# Patient Record
Sex: FEMALE | Race: WHITE
[De-identification: names, ages, dates, MRNs, and addresses within clinical notes are randomized per-mention and may not be internally consistent; named-entity substitution may affect disease eponyms.]

---

## 2016-08-10 LAB — PAP SMEAR - HIM PATIENT REPORTED: NEGATIVE

## 2017-09-10 ENCOUNTER — HEALTH MAINTENANCE LETTER (OUTPATIENT)
Age: 52
End: 2017-09-10

## 2017-09-11 ENCOUNTER — OFFICE VISIT (OUTPATIENT)
Dept: OPHTHALMOLOGY | Facility: CLINIC | Age: 52
End: 2017-09-11

## 2017-09-11 VITALS — WEIGHT: 135 LBS | HEIGHT: 66 IN | BODY MASS INDEX: 21.69 KG/M2

## 2017-09-11 DIAGNOSIS — H02.836 DERMATOCHALASIS OF EYELIDS OF BOTH EYES: ICD-10-CM

## 2017-09-11 DIAGNOSIS — H02.833 DERMATOCHALASIS OF EYELIDS OF BOTH EYES: ICD-10-CM

## 2017-09-11 DIAGNOSIS — H02.839 DERMATOCHALASIS: Primary | ICD-10-CM

## 2017-09-11 ASSESSMENT — CONF VISUAL FIELD
OD_NORMAL: 1
OS_NORMAL: 1

## 2017-09-11 ASSESSMENT — VISUAL ACUITY
METHOD: SNELLEN - LINEAR
OS_SC+: +
OD_SC: 20/15
OS_SC: 20/40

## 2017-09-11 ASSESSMENT — MARGIN REFLEX DISTANCE
OS_MRD1: 3
OD_MRD1: 3

## 2017-09-11 ASSESSMENT — TONOMETRY
OS_IOP_MMHG: 10
OD_IOP_MMHG: 09
IOP_METHOD: ICARE

## 2017-09-11 ASSESSMENT — EXTERNAL EXAM - LEFT EYE: OS_EXAM: NORMAL

## 2017-09-11 ASSESSMENT — SLIT LAMP EXAM - LIDS
COMMENTS: NORMAL
COMMENTS: NORMAL

## 2017-09-11 ASSESSMENT — EXTERNAL EXAM - RIGHT EYE: OD_EXAM: NORMAL

## 2017-09-11 NOTE — PROGRESS NOTES
Chief Complaints and History of Present Illnesses   Patient presents with     Consult For     droopy lids       Holding her lids open by the end of the day.  Interfering with daily life, hard to read.  Friends and family has noticed her hand positioning keeping her eyes open.    Trouble swallowing periodically and chewing late in the day.  Does say she's has lupus but not MS.  Occasional diplopia.   Chronic history of migraines.       Oma Gaona is a 52 year old female with the following diagnoses:   1. Dermatochalasis    2. Dermatochalasis of eyelids of both eyes         FUNCTIONAL COMPLAINTS RELATED TO DROOPY EYELIDS/BROWS:  Oma Gaona describes upper lids interfering with superior visual field and interfering with activities of daily living including reading, driving and watching television.     EXAM:   Dominant eye right  Minimal fatigue on upward gaze testing    MRD1: Right eye 3   Left eye 3  Dermatochalasis with excess skin touching eyelashes  Brow ptosis with brow resting below superior orbital rim    VISUAL FIELD:  Right eye untaped:15 degrees Right eye taped:45 degrees  Left eye untaped:10 degrees Left eye taped:45 degrees    Right eye visual field improves by: 30 degrees  Left eye visual field improves by: 35 degrees      PLAN:  Bilateral upper blepharoplasty - skin + nasal fat pad resection + brassiere suture         Tima Caldera MD  Ophthalmology Resident, PGY-2    Attending Physician Attestation:  I have seen and examined this patient .  I have confirmed and edited as necessary the chief complaint(s), history of present illness, review of systems, relevant history, and examination findings as documented by others.  I have personally reviewed the relevant tests, images, and reports as documented above.  I have confirmed and edited as necessary the assessment and plan and agree with this note.    - Samuel Abdi MD 2:16 PM 9/11/2017    Photographs were obtained to document the findings  recorded under exam. These will be stored for future reference and comparison. The plan is documented under assessment and plan above.   - Samuel Abdi MD 2:16 PM 9/11/2017    Today with Oma Gaona, I reviewed the indications, risks, benefits, and alternatives of the proposed surgical procedure including, but not limited to, failure obtain the desired result  and need for additional surgery, bleeding, infection, loss of vision, loss of the eye, and the remote possibility of permanent damage to any organ system or death with the use of anesthesia.  I provided multiple opportunities for the questions, answered all questions to the best of my ability, and confirmed that my answers and my discussion were understood.     - Samuel Abdi MD 2:16 PM 9/11/2017

## 2017-09-11 NOTE — PATIENT INSTRUCTIONS
BLEPHAROPLASTY    Your eyes are often the first thing people notice about you and are an important aspect of your overall appearance. As we age, the tone and shape of our eyelids can loosen and sag. Heredity and sun exposure also contribute to this process. This excess, puffy or lax skin can make you appear more tired or appear older. Eyelid surgery or blepharoplasty (pronounced  zlkz-y-iz-plasty ) can give the eyes a more youthful look by removing excess skin, bulging fat, and lax muscle from the upper or lower eyelids. If the sagging upper eyelid skin obstructs peripheral vision, blepharoplasty can eliminate the obstruction and expand the visual field.     Upper Blepharoplasty     For the upper eyelids, excess skin and fat are removed through an incision hidden in the natural eyelid crease. If the lid is droopy (ptosis), the muscle that raises the upper eyelid can be tightened. The incision is then closed with fine sutures.     Lower Blepharoplasty     Fat in the lower eyelids can be removed or repositioned through an incision hidden on the inner surface of the eyelid.  If there is excessive skin in the lower lid, the skin can be removed through incision is made just below the lashes. Fat can be removed or repositioned through this incision, and the excess skin removed. The incision is then closed with fine sutures.     Upper and Lower Blepharoplasty     Upper and lower blepharoplasty can be performed together and also can be combined with other procedures such as eyebrow or forehead lift, midface lift, face lift, neck lift, or laser skin resurfacing.  The procedures are typically performed as an outpatient procedure and typically take 45 min to 1.5 hours to perform.  Most patients can return to normal activities within 1-2 weeks. Makeup may be worn to camouflage any bruising after one week.       Who Should Perform A Blepharoplasty?     When choosing a surgeon to perform blepharoplasty, look for a cosmetic and  reconstructive surgeon who specializes in the eyelids, orbit, and tear drain system. Dr. Abdi s membership in the American Society of Ophthalmic Plastic and Reconstructive Surgery (ASOPRS) indicates he is not only a board certified ophthalmologist who knows the anatomy and structure of the eyelids and orbit, but also has had extensive training in ophthalmic plastic reconstructive and cosmetic surgery.

## 2017-09-11 NOTE — MR AVS SNAPSHOT
After Visit Summary   9/11/2017    Oma Gaona    MRN: [id number]           Patient Information     Date Of Birth          1965        Visit Information        Provider Department      9/11/2017 1:15 PM Samuel Abdi MD [facility] Ophthalmology        Today's Diagnoses     Dermatochalasis    -  1    Dermatochalasis of eyelids of both eyes          Care Instructions    BLEPHAROPLASTY    Your eyes are often the first thing people notice about you and are an important aspect of your overall appearance. As we age, the tone and shape of our eyelids can loosen and sag. Heredity and sun exposure also contribute to this process. This excess, puffy or lax skin can make you appear more tired or appear older. Eyelid surgery or blepharoplasty (pronounced  nlqf-k-lo-plasty ) can give the eyes a more youthful look by removing excess skin, bulging fat, and lax muscle from the upper or lower eyelids. If the sagging upper eyelid skin obstructs peripheral vision, blepharoplasty can eliminate the obstruction and expand the visual field.     Upper Blepharoplasty     For the upper eyelids, excess skin and fat are removed through an incision hidden in the natural eyelid crease. If the lid is droopy (ptosis), the muscle that raises the upper eyelid can be tightened. The incision is then closed with fine sutures.     Lower Blepharoplasty     Fat in the lower eyelids can be removed or repositioned through an incision hidden on the inner surface of the eyelid.  If there is excessive skin in the lower lid, the skin can be removed through incision is made just below the lashes. Fat can be removed or repositioned through this incision, and the excess skin removed. The incision is then closed with fine sutures.     Upper and Lower Blepharoplasty     Upper and lower blepharoplasty can be performed together and also can be combined with other procedures such as eyebrow or forehead lift, midface lift, face lift, neck lift,  or laser skin resurfacing.  The procedures are typically performed as an outpatient procedure and typically take 45 min to 1.5 hours to perform.  Most patients can return to normal activities within 1-2 weeks. Makeup may be worn to camouflage any bruising after one week.       Who Should Perform A Blepharoplasty?     When choosing a surgeon to perform blepharoplasty, look for a cosmetic and reconstructive surgeon who specializes in the eyelids, orbit, and tear drain system. Dr. Abdi s membership in the American Society of Ophthalmic Plastic and Reconstructive Surgery (ASOPRS) indicates he is not only a board certified ophthalmologist who knows the anatomy and structure of the eyelids and orbit, but also has had extensive training in ophthalmic plastic reconstructive and cosmetic surgery.              Follow-ups after your visit        Your next 10 appointments already scheduled     Nov 27, 2017  1:15 PM CST   (Arrive by 1:00 PM)   Post-Op with Samuel Abdi MD   [facility] ([facility] [place])    [address] [place] [id number] [phone]              Who to contact     Please call your clinic at [phone] to:    Ask questions about your health    Make or cancel appointments    Discuss your medicines    Learn about your test results    Speak to your doctor   If you have compliments or concerns about an experience at your clinic, or if you wish to file a complaint, please contact [facility] Patient Relations at [phone] or email us at [email or web address]         Additional Information About Your Visit        MyChart Information     [employer] gives you secure access to your electronic health record. If you see a primary care provider, you can also send messages to your care team and make appointments. If you have questions, please call your primary care clinic.  If you do not have a primary care provider,  "please call [phone] and they will assist you.      [employer] is an electronic gateway that provides easy, online access to your medical records. With [employer], you can request a clinic appointment, read your test results, renew a prescription or communicate with your care team.     To access your existing account, please contact your [facility] or call [phone] for assistance.        Care EveryWhere ID     This is your Care EveryWhere ID. This could be used by other organizations to access your [facility] medical records  [id number]        Your Vitals Were     Height BMI (Body Mass Index)                1.676 m (5' 6\") 21.79 kg/m2           Blood Pressure from Last 3 Encounters:   02/15/16 114/66    Weight from Last 3 Encounters:   09/11/17 61.2 kg (135 lb)   02/15/16 64 kg (141 lb)              We Performed the Following     External Photos OU (both eyes)     Link VF Ptosis OU     Jany-Operative Worksheet (Plastics)        Primary Care Provider    None Specified       No primary provider on file.        Equal Access to Services     [facility] Center[facility] : Hadii brenda garcia hadasho Soomaali, waaxda luqadaha, qaybta kaalmada adeamadoyamyra, alexandr garza . So [facility] [phone].    ATENCIÓN: Si habla español, tiene a [place] disposición servicios gratuitos de asistencia lingüística. Llame al [phone].    We comply with applicable federal civil rights laws and [place] laws. We do not discriminate on the basis of race, color, national origin, age, disability sex, sexual orientation or gender identity.            Thank you!     Thank you for choosing [facility] OPHTHALMOLOGY  for your care. Our goal is always to provide you with excellent care. Hearing back from our patients is one way we can continue to improve our services. Please take a few minutes to complete the written survey that you may receive in the mail after your visit with us. Thank you!             Your Updated " Medication List - Protect others around you: Learn how to safely use, store and throw away your medicines at www.disposemymeds.org.          This list is accurate as of: 9/11/17  2:32 PM.  Always use your most recent med list.                   Brand Name Dispense Instructions for use Diagnosis    ADVIL PO           atovaquone-proguanil 250-100 MG per tablet    MALARONE    46 tablet    Take 1 tablet by mouth daily Start 2 days before exposure to Malaria and continue daily till  7 days after exposure.    Travel advice encounter       ciprofloxacin 500 MG tablet    CIPRO    12 tablet    Take 1 tablet (500 mg) by mouth 2 times daily    Travel advice encounter       levonorgestrel-ethinyl estradiol 0.15-30 MG-MCG per tablet    NORDETTE     Take 1 tablet by mouth daily

## 2017-09-11 NOTE — NURSING NOTE
Chief Complaints and History of Present Illnesses   Patient presents with     Consult For     droopy lids     HPI    Affected eye(s):  Both   Symptoms:     Blurred vision   Tearing   Dryness            Comments:  Patient states that by the end of the day she is very tired and has to hold up her lids to read. No eye pain. Uses AT gel PRN for tearing OU.    Naa Youssef COT 1:17 PM September 11, 2017

## 2017-10-25 ENCOUNTER — OFFICE VISIT (OUTPATIENT)
Dept: FAMILY MEDICINE | Facility: CLINIC | Age: 52
End: 2017-10-25

## 2017-10-25 VITALS
WEIGHT: 142.5 LBS | OXYGEN SATURATION: 98 % | TEMPERATURE: 98.6 F | HEIGHT: 66 IN | SYSTOLIC BLOOD PRESSURE: 100 MMHG | DIASTOLIC BLOOD PRESSURE: 65 MMHG | HEART RATE: 77 BPM | BODY MASS INDEX: 22.9 KG/M2

## 2017-10-25 DIAGNOSIS — H02.403 PTOSIS OF BOTH EYELIDS: ICD-10-CM

## 2017-10-25 DIAGNOSIS — Z01.818 PREOP GENERAL PHYSICAL EXAM: Primary | ICD-10-CM

## 2017-10-25 PROCEDURE — 99214 OFFICE O/P EST MOD 30 MIN: CPT | Performed by: FAMILY MEDICINE

## 2017-10-25 NOTE — PATIENT INSTRUCTIONS
Preventive Health Recommendations  Female Ages 50 - 64    Yearly exam: See your health care provider every year in order to  o Review health changes.   o Discuss preventive care.    o Review your medicines if your doctor has prescribed any.      Get a Pap test every three years (unless you have an abnormal result and your provider advises testing more often).    If you get Pap tests with HPV test, you only need to test every 5 years, unless you have an abnormal result.     You do not need a Pap test if your uterus was removed (hysterectomy) and you have not had cancer.    You should be tested each year for STDs (sexually transmitted diseases) if you're at risk.     Have a mammogram every 1 to 2 years.    Have a colonoscopy at age 50, or have a yearly FIT test (stool test). These exams screen for colon cancer.      Have a cholesterol test every 5 years, or more often if advised.    Have a diabetes test (fasting glucose) every three years. If you are at risk for diabetes, you should have this test more often.     If you are at risk for osteoporosis (brittle bone disease), think about having a bone density scan (DEXA).    Shots: Get a flu shot each year. Get a tetanus shot every 10 years.    Nutrition:     Eat at least 5 servings of fruits and vegetables each day.    Eat whole-grain bread, whole-wheat pasta and brown rice instead of white grains and rice.    Talk to your provider about Calcium and Vitamin D.     Lifestyle    Exercise at least 150 minutes a week (30 minutes a day, 5 days a week). This will help you control your weight and prevent disease.    Limit alcohol to one drink per day.    No smoking.     Wear sunscreen to prevent skin cancer.     See your dentist every six months for an exam and cleaning.    See your eye doctor every 1 to 2 years.    Before Your Surgery      Call your surgeon if there is any change in your health. This includes signs of a cold or flu (such as a sore throat, runny nose, cough,  rash or fever).    Do not smoke, drink alcohol or take over the counter medicine (unless your surgeon or primary care doctor tells you to) for the 24 hours before and after surgery.    If you take prescribed drugs: Follow your doctor s orders about which medicines to take and which to stop until after surgery.    Eating and drinking prior to surgery: follow the instructions from your surgeon    Take a shower or bath the night before surgery. Use the soap your surgeon gave you to gently clean your skin. If you do not have soap from your surgeon, use your regular soap. Do not shave or scrub the surgery site.  Wear clean pajamas and have clean sheets on your bed.

## 2017-10-25 NOTE — NURSING NOTE
"Chief Complaint   Patient presents with     Pre-Op Exam       Initial /65  Pulse 77  Temp 98.6  F (37  C) (Oral)  Ht 5' 6\" (1.676 m)  Wt 142 lb 8 oz (64.6 kg)  SpO2 98%  BMI 23 kg/m2 Estimated body mass index is 23 kg/(m^2) as calculated from the following:    Height as of this encounter: 5' 6\" (1.676 m).    Weight as of this encounter: 142 lb 8 oz (64.6 kg).  Medication Reconciliation: complete      Health Maintenance that is potentially due pending provider review:  Mammogram, Pap Smear and Colonoscopy/FIT    Pt had pap smear done on this date 08/2016, with this [facility], results were normal  Patient is already scheduled for colonoscopy on Monday 10/30.  Patient will schedule mammogram appt.     DANETTE Horan  "

## 2017-10-25 NOTE — PROGRESS NOTES
[facility]  [address] [place]  [facility] [address]  [phone]  Dept: [phone]    PRE-OP EVALUATION:  Today's date: 10/25/2017    Oma Gaona (: 1965) presents for pre-operative evaluation assessment as requested by Dr. Samuel Abdi.  She requires evaluation and anesthesia risk assessment prior to undergoing surgery/procedure for treatment of Bilateral Upper Blepharoplasty.  Proposed procedure: BLEPHAROPLASTY BILATERAL    Date of Surgery/ Procedure: 17  Time of Surgery/ Procedure: 7:55 AM  Hospital/Surgical Facility: [facility]  Primary Physician: Janey Goncalves  Type of Anesthesia Anticipated: to be determined    Patient has a Health Care Directive or Living Will:  YES     1. NO - Do you have a history of heart attack, stroke, stent, bypass or surgery on an artery in the head, neck, heart or legs?  2. NO - Do you ever have any pain or discomfort in your chest?  3. NO - Do you have a history of  Heart Failure?  4. NO - Are you troubled by shortness of breath when: walking on the level, up a slight hill or at night?  5. NO - Do you currently have a cold, bronchitis or other respiratory infection?  6. NO - Do you have a cough, shortness of breath or wheezing?  7. NO - Do you sometimes get pains in the calves of your legs when you walk?  8. YES - Do you or anyone in your family have previous history of blood clots?  9. NO - Do you or does anyone in your family have a serious bleeding problem such as prolonged bleeding following surgeries or cuts?  10. YES - Have you ever had problems with anemia or been told to take iron pills?  11. NO - Have you had any abnormal blood loss such as black, tarry or bloody stools, or abnormal vaginal bleeding?  12. NO - Have you ever had a blood transfusion?  13. NO - Have you or any of your relatives ever had problems with anesthesia? no just vomiting  14. NO - Do you have sleep apnea, excessive snoring or daytime drowsiness?  15. NO - Do  "you have any prosthetic heart valves?  16. NO - Do you have prosthetic joints?  17. NO - Is there any chance that you may be pregnant?        HPI:                                                      Brief HPI related to upcoming procedure:       See problem list for active medical problems.  Problems all longstanding and stable, except as noted/documented.  See ROS for pertinent symptoms related to these conditions.                                                                                                  .    MEDICAL HISTORY:                                                    There are no active problems to display for this patient.     Past Medical History:   Diagnosis Date     Lupus      No past surgical history on file.  Current Outpatient Prescriptions   Medication Sig Dispense Refill     levonorgestrel-ethinyl estradiol (NORDETTE) 0.15-30 MG-MCG per tablet Take 1 tablet by mouth daily       Ibuprofen (ADVIL PO)        OTC products: None, except as noted above    No Known Allergies   Latex Allergy: YES: Precautions to take: very sensitive with it     Social History   Substance Use Topics     Smoking status: Former Smoker     Quit date: 1993     Smokeless tobacco: Never Used     Alcohol use Not on file     History   Drug Use Not on file       REVIEW OF SYSTEMS:                                                    Constitutional, neuro, ENT, endocrine, pulmonary, cardiac, gastrointestinal, genitourinary, musculoskeletal, integument and psychiatric systems are negative, except as otherwise noted.      EXAM:                                                    /65  Pulse 77  Temp 98.6  F (37  C) (Oral)  Ht 5' 6\" (1.676 m)  Wt 142 lb 8 oz (64.6 kg)  SpO2 98%  BMI 23 kg/m2    GENERAL APPEARANCE: healthy, alert and no distress     EYES: EOMI, PERRL     HENT: ear canals and TM's normal and nose and mouth without ulcers or lesions     NECK: no adenopathy, no asymmetry, masses, or scars and thyroid normal " to palpation     RESP: lungs clear to auscultation - no rales, rhonchi or wheezes     CV: regular rates and rhythm, normal S1 S2, no S3 or S4 and no murmur, click or rub     ABDOMEN:  soft, nontender, no HSM or masses and bowel sounds normal     MS: extremities normal- no gross deformities noted, no evidence of inflammation in joints, FROM in all extremities.     SKIN: no suspicious lesions or rashes     NEURO: Normal strength and tone, sensory exam grossly normal, mentation intact and speech normal     PSYCH: mentation appears normal. and affect normal/bright     LYMPHATICS: No axillary, cervical, or supraclavicular nodes    DIAGNOSTICS:                                                    No labs or EKG required for low risk surgery (cataract, skin procedure, breast biopsy, etc)    No results for input(s): HGB, PLT, INR, NA, POTASSIUM, CR, A1C in the last 07516 hours.     IMPRESSION:                                                    Reason for surgery/procedure: Ptosis of upper eyelids mike, Surgical correction/Blepharrhoplasty   Diagnosis/reason for consult: Preoperative clearing     The proposed surgical procedure is considered LOW risk.    REVISED CARDIAC RISK INDEX  The patient has the following serious cardiovascular risks for perioperative complications such as (MI, PE, VFib and 3  AV Block):  No serious cardiac risks      The patient has the following additional risks for perioperative complications:  No identified additional risks      ICD-10-CM    1. Preop general physical exam Z01.818    2. Ptosis of both eyelids H02.403        RECOMMENDATIONS:                                                          --Patient is to take all scheduled medications on the day of surgery EXCEPT for modifications listed below.    APPROVAL GIVEN to proceed with proposed procedure, without further diagnostic evaluation. ATTN: She vomits after anesthesia and would need to have some Zofran prior to the surgery .       Signed  Electronically by: Janey Goncalves MD    Copy of this evaluation report is provided to requesting physician.    [facility] Preop Guidelines

## 2017-10-25 NOTE — MR AVS SNAPSHOT
After Visit Summary   10/25/2017    Oma Gaona    MRN: [id number]           Patient Information     Date Of Birth          1965        Visit Information        Provider Department      10/25/2017 3:00 PM Janey Goncalves MD [facility]        Today's Diagnoses     Preop general physical exam    -  1    Ptosis of both eyelids          Care Instructions      Preventive Health Recommendations  Female Ages 50 - 64    Yearly exam: See your health care provider every year in order to  o Review health changes.   o Discuss preventive care.    o Review your medicines if your doctor has prescribed any.      Get a Pap test every three years (unless you have an abnormal result and your provider advises testing more often).    If you get Pap tests with HPV test, you only need to test every 5 years, unless you have an abnormal result.     You do not need a Pap test if your uterus was removed (hysterectomy) and you have not had cancer.    You should be tested each year for STDs (sexually transmitted diseases) if you're at risk.     Have a mammogram every 1 to 2 years.    Have a colonoscopy at age 50, or have a yearly FIT test (stool test). These exams screen for colon cancer.      Have a cholesterol test every 5 years, or more often if advised.    Have a diabetes test (fasting glucose) every three years. If you are at risk for diabetes, you should have this test more often.     If you are at risk for osteoporosis (brittle bone disease), think about having a bone density scan (DEXA).    Shots: Get a flu shot each year. Get a tetanus shot every 10 years.    Nutrition:     Eat at least 5 servings of fruits and vegetables each day.    Eat whole-grain bread, whole-wheat pasta and brown rice instead of white grains and rice.    Talk to your provider about Calcium and Vitamin D.     Lifestyle    Exercise at least 150 minutes a week (30 minutes a day, 5 days a week). This will help you control your weight  and prevent disease.    Limit alcohol to one drink per day.    No smoking.     Wear sunscreen to prevent skin cancer.     See your dentist every six months for an exam and cleaning.    See your eye doctor every 1 to 2 years.    Before Your Surgery      Call your surgeon if there is any change in your health. This includes signs of a cold or flu (such as a sore throat, runny nose, cough, rash or fever).    Do not smoke, drink alcohol or take over the counter medicine (unless your surgeon or primary care doctor tells you to) for the 24 hours before and after surgery.    If you take prescribed drugs: Follow your doctor s orders about which medicines to take and which to stop until after surgery.    Eating and drinking prior to surgery: follow the instructions from your surgeon    Take a shower or bath the night before surgery. Use the soap your surgeon gave you to gently clean your skin. If you do not have soap from your surgeon, use your regular soap. Do not shave or scrub the surgery site.  Wear clean pajamas and have clean sheets on your bed.           Follow-ups after your visit        Your next 10 appointments already scheduled     Nov 08, 2017   Procedure with Samuel Abdi MD   [facility] ([facility])    [address] [place] [id number] [phone]           Located in the Clinics and Surgery Center at [address] [place] [address].   parking is very convenient and highly recommended.  is a $6 flat rate fee.  Both  and self parkers should enter the main arrival plaza from [facility]; parking attendants will direct you based on your parking preference.            Nov 27, 2017  1:15 PM CST   (Arrive by 1:00 PM)   Post-Op with Samuel Abdi MD   [facility] ([facility])    [address] [place] [id number] [phone]              Who to  "contact     If you have questions or need follow up information about today's clinic visit or your schedule please contact [facility] directly at [phone].  Normal or non-critical lab and imaging results will be communicated to you by MyChart, letter or phone within 4 business days after the clinic has received the results. If you do not hear from us within 7 days, please contact the clinic through MyChart or phone. If you have a critical or abnormal lab result, we will notify you by phone as soon as possible.  Submit refill requests through [employer] or call your pharmacy and they will forward the refill request to us. Please allow 3 business days for your refill to be completed.          Additional Information About Your Visit        [employer] Information     [employer] gives you secure access to your electronic health record. If you see a primary care provider, you can also send messages to your care team and make appointments. If you have questions, please call your primary care clinic.  If you do not have a primary care provider, please call [phone] and they will assist you.        Care EveryWhere ID     This is your Care EveryWhere ID. This could be used by other organizations to access your [place] medical records  [id number]        Your Vitals Were     Pulse Temperature Height Pulse Oximetry BMI (Body Mass Index)       77 98.6  F (37  C) (Oral) 5' 6\" (1.676 m) 98% 23 kg/m2        Blood Pressure from Last 3 Encounters:   10/25/17 100/65   02/15/16 114/66    Weight from Last 3 Encounters:   10/25/17 142 lb 8 oz (64.6 kg)   09/11/17 135 lb (61.2 kg)   02/15/16 141 lb (64 kg)              Today, you had the following     No orders found for display       Primary Care Provider Office Phone # Fax #    Janey Goncalves [phone] [address] [address]        Equal Access to Services     DALY AGUILA AH: jenna Alvarez, " marah rasmussenalvero issacheng boydin hayaan adeeg kharash la'aan ah. So [facility] [phone].    ATENCIÓN: Si carline bolden, tiene a [place] disposición servicios gratuitos de asistencia lingüística. Desiree al [phone].    We comply with applicable federal civil rights laws and [place] laws. We do not discriminate on the basis of race, color, national origin, age, disability, sex, sexual orientation, or gender identity.            Thank you!     Thank you for choosing [facility]  for your care. Our goal is always to provide you with excellent care. Hearing back from our patients is one way we can continue to improve our services. Please take a few minutes to complete the written survey that you may receive in the mail after your visit with us. Thank you!             Your Updated Medication List - Protect others around you: Learn how to safely use, store and throw away your medicines at www.disposemymeds.org.          This list is accurate as of: 10/25/17  3:44 PM.  Always use your most recent med list.                   Brand Name Dispense Instructions for use Diagnosis    ADVIL PO           levonorgestrel-ethinyl estradiol 0.15-30 MG-MCG per tablet    DION     Take 1 tablet by mouth daily

## 2017-10-25 NOTE — PROGRESS NOTES
"   SUBJECTIVE:   CC: Oma Gaona is an 52 year old woman who presents for preventive health visit.     Healthy Habits:    Do you get at least three servings of calcium containing foods daily (dairy, green leafy vegetables, etc.)? {YES/NO, DAIRY INTAKE:430132::\"yes\"}    Amount of exercise or daily activities, outside of work: {AMOUNT EXERCISE:577245}    Problems taking medications regularly {Yes /No default:055130::\"No\"}    Medication side effects: {Yes /No default.:133309::\"No\"}    Have you had an eye exam in the past two years? {YESNOBLANK:326000}    Do you see a dentist twice per year? {YESNOBLANK:045878}    Do you have sleep apnea, excessive snoring or daytime drowsiness?{YESNOBLANK:969329}    {Outside tests to abstract? :457390}    {additional problems to add (Optional):748910}    Today's PHQ-2 Score: No flowsheet data found.  {PHQ-2 LOOK IN ASSESSMENTS (Optional) :382995}  Abuse: Current or Past(Physical, Sexual or Emotional)- {YES/NO/NA:606921}  Do you feel safe in your environment - {YES/NO/NA:817566}    Social History   Substance Use Topics     Smoking status: Former Smoker     Quit date: 1993     Smokeless tobacco: Never Used     Alcohol use Not on file     {ETOH AUDIT:284555}    Reviewed orders with patient.  Reviewed health maintenance and updated orders accordingly - {Yes/No:286755::\"Yes\"}  {Chronicprobdata (Optional):519197}    {Mammo Decision Support (Optional):371245}    Pertinent mammograms are reviewed under the imaging tab.  History of abnormal Pap smear: {PAP HX:124809}    Reviewed and updated as needed this visit by clinical staff         Reviewed and updated as needed this visit by Provider        {HISTORY OPTIONS (Optional):725329}    ROS:  {FEMALE PREVENTATIVE ROS:613244}    OBJECTIVE:   There were no vitals taken for this visit.  EXAM:  {Exam Choices:685339}    ASSESSMENT/PLAN:   {Diag Picklist:020391}    COUNSELING:   {FEMALE COUNSELING MESSAGES:467018::\"Reviewed preventive health " "counseling, as reflected in patient instructions\"}    {BP Counseling- Complete if BP >= 120/80  (Optional):203832}     reports that she quit smoking about 24 years ago. She has never used smokeless tobacco.  {Tobacco Cessation -- Complete if patient is a smoker (Optional):483132}  Estimated body mass index is 21.79 kg/(m^2) as calculated from the following:    Height as of 9/11/17: 5' 6\" (1.676 m).    Weight as of 9/11/17: 135 lb (61.2 kg).   {Weight Management Plan (ACO) Complete if BMI is abnormal-  Ages 18-64  BMI >24.9.  Age 65+ with BMI <23 or >30 (Optional):197019}    Counseling Resources:  ATP IV Guidelines  Pooled Cohorts Equation Calculator  Breast Cancer Risk Calculator  FRAX Risk Assessment  ICSI Preventive Guidelines  Dietary Guidelines for Americans, 2010  USDA's MyPlate  ASA Prophylaxis  Lung CA Screening    Janey Goncalves MD  [facility]  "

## 2017-10-30 ENCOUNTER — TRANSFERRED RECORDS (OUTPATIENT)
Dept: HEALTH INFORMATION MANAGEMENT | Facility: CLINIC | Age: 52
End: 2017-10-30

## 2017-11-07 ENCOUNTER — ANESTHESIA EVENT (OUTPATIENT)
Dept: SURGERY | Facility: AMBULATORY SURGERY CENTER | Age: 52
End: 2017-11-07

## 2017-11-08 ENCOUNTER — HOSPITAL ENCOUNTER (OUTPATIENT)
Facility: AMBULATORY SURGERY CENTER | Age: 52
End: 2017-11-08
Attending: OPHTHALMOLOGY

## 2017-11-08 ENCOUNTER — SURGERY (OUTPATIENT)
Age: 52
End: 2017-11-08

## 2017-11-08 ENCOUNTER — ANESTHESIA (OUTPATIENT)
Dept: SURGERY | Facility: AMBULATORY SURGERY CENTER | Age: 52
End: 2017-11-08

## 2017-11-08 VITALS
BODY MASS INDEX: 21.97 KG/M2 | HEIGHT: 67 IN | WEIGHT: 140 LBS | SYSTOLIC BLOOD PRESSURE: 107 MMHG | OXYGEN SATURATION: 96 % | RESPIRATION RATE: 16 BRPM | DIASTOLIC BLOOD PRESSURE: 67 MMHG | TEMPERATURE: 98.4 F

## 2017-11-08 DIAGNOSIS — Z98.890 POSTOPERATIVE EYE STATE: Primary | ICD-10-CM

## 2017-11-08 LAB
HCG UR QL: NEGATIVE
INTERNAL QC OK POCT: YES

## 2017-11-08 RX ORDER — MEPERIDINE HYDROCHLORIDE 25 MG/ML
12.5 INJECTION INTRAMUSCULAR; INTRAVENOUS; SUBCUTANEOUS
Status: DISCONTINUED | OUTPATIENT
Start: 2017-11-08 | End: 2017-11-09 | Stop reason: HOSPADM

## 2017-11-08 RX ORDER — ACETAMINOPHEN 325 MG/1
975 TABLET ORAL ONCE
Status: COMPLETED | OUTPATIENT
Start: 2017-11-08 | End: 2017-11-08

## 2017-11-08 RX ORDER — SODIUM CHLORIDE, SODIUM LACTATE, POTASSIUM CHLORIDE, CALCIUM CHLORIDE 600; 310; 30; 20 MG/100ML; MG/100ML; MG/100ML; MG/100ML
INJECTION, SOLUTION INTRAVENOUS CONTINUOUS
Status: DISCONTINUED | OUTPATIENT
Start: 2017-11-08 | End: 2017-11-09 | Stop reason: HOSPADM

## 2017-11-08 RX ORDER — SODIUM CHLORIDE, SODIUM LACTATE, POTASSIUM CHLORIDE, CALCIUM CHLORIDE 600; 310; 30; 20 MG/100ML; MG/100ML; MG/100ML; MG/100ML
INJECTION, SOLUTION INTRAVENOUS CONTINUOUS
Status: DISCONTINUED | OUTPATIENT
Start: 2017-11-08 | End: 2017-11-08 | Stop reason: HOSPADM

## 2017-11-08 RX ORDER — TETRACAINE HYDROCHLORIDE 5 MG/ML
SOLUTION OPHTHALMIC PRN
Status: DISCONTINUED | OUTPATIENT
Start: 2017-11-08 | End: 2017-11-08 | Stop reason: HOSPADM

## 2017-11-08 RX ORDER — ONDANSETRON 4 MG/1
4 TABLET, ORALLY DISINTEGRATING ORAL EVERY 30 MIN PRN
Status: DISCONTINUED | OUTPATIENT
Start: 2017-11-08 | End: 2017-11-09 | Stop reason: HOSPADM

## 2017-11-08 RX ORDER — PROPOFOL 10 MG/ML
INJECTION, EMULSION INTRAVENOUS CONTINUOUS PRN
Status: DISCONTINUED | OUTPATIENT
Start: 2017-11-08 | End: 2017-11-08

## 2017-11-08 RX ORDER — NALOXONE HYDROCHLORIDE 0.4 MG/ML
.1-.4 INJECTION, SOLUTION INTRAMUSCULAR; INTRAVENOUS; SUBCUTANEOUS
Status: DISCONTINUED | OUTPATIENT
Start: 2017-11-08 | End: 2017-11-09 | Stop reason: HOSPADM

## 2017-11-08 RX ORDER — LIDOCAINE HYDROCHLORIDE AND EPINEPHRINE 15; 5 MG/ML; UG/ML
INJECTION, SOLUTION EPIDURAL PRN
Status: DISCONTINUED | OUTPATIENT
Start: 2017-11-08 | End: 2017-11-08 | Stop reason: HOSPADM

## 2017-11-08 RX ORDER — HYDROCODONE BITARTRATE AND ACETAMINOPHEN 5; 325 MG/1; MG/1
1 TABLET ORAL EVERY 6 HOURS PRN
Qty: 10 TABLET | Refills: 0 | Status: SHIPPED | OUTPATIENT
Start: 2017-11-08 | End: 2018-03-30

## 2017-11-08 RX ORDER — ERYTHROMYCIN 5 MG/G
OINTMENT OPHTHALMIC
Qty: 3.5 G | Refills: 0 | Status: SHIPPED | OUTPATIENT
Start: 2017-11-08 | End: 2018-03-30

## 2017-11-08 RX ORDER — BUPIVACAINE HYDROCHLORIDE 5 MG/ML
INJECTION, SOLUTION PERINEURAL PRN
Status: DISCONTINUED | OUTPATIENT
Start: 2017-11-08 | End: 2017-11-08 | Stop reason: HOSPADM

## 2017-11-08 RX ORDER — ERYTHROMYCIN 5 MG/G
OINTMENT OPHTHALMIC PRN
Status: DISCONTINUED | OUTPATIENT
Start: 2017-11-08 | End: 2017-11-08 | Stop reason: HOSPADM

## 2017-11-08 RX ORDER — LIDOCAINE 40 MG/G
CREAM TOPICAL
Status: DISCONTINUED | OUTPATIENT
Start: 2017-11-08 | End: 2017-11-08 | Stop reason: HOSPADM

## 2017-11-08 RX ORDER — KETOROLAC TROMETHAMINE 30 MG/ML
30 INJECTION, SOLUTION INTRAMUSCULAR; INTRAVENOUS EVERY 6 HOURS PRN
Status: DISCONTINUED | OUTPATIENT
Start: 2017-11-08 | End: 2017-11-09 | Stop reason: HOSPADM

## 2017-11-08 RX ORDER — PROPOFOL 10 MG/ML
INJECTION, EMULSION INTRAVENOUS PRN
Status: DISCONTINUED | OUTPATIENT
Start: 2017-11-08 | End: 2017-11-08

## 2017-11-08 RX ORDER — LIDOCAINE HYDROCHLORIDE 20 MG/ML
INJECTION, SOLUTION INFILTRATION; PERINEURAL PRN
Status: DISCONTINUED | OUTPATIENT
Start: 2017-11-08 | End: 2017-11-08

## 2017-11-08 RX ORDER — ONDANSETRON 4 MG/1
4 TABLET, FILM COATED ORAL EVERY 8 HOURS PRN
Qty: 10 TABLET | Refills: 0 | Status: SHIPPED | OUTPATIENT
Start: 2017-11-08 | End: 2018-03-30

## 2017-11-08 RX ORDER — FENTANYL CITRATE 50 UG/ML
25-50 INJECTION, SOLUTION INTRAMUSCULAR; INTRAVENOUS
Status: DISCONTINUED | OUTPATIENT
Start: 2017-11-08 | End: 2017-11-08 | Stop reason: HOSPADM

## 2017-11-08 RX ORDER — ONDANSETRON 2 MG/ML
4 INJECTION INTRAMUSCULAR; INTRAVENOUS EVERY 30 MIN PRN
Status: DISCONTINUED | OUTPATIENT
Start: 2017-11-08 | End: 2017-11-09 | Stop reason: HOSPADM

## 2017-11-08 RX ADMIN — PROPOFOL 100 MCG/KG/MIN: 10 INJECTION, EMULSION INTRAVENOUS at 10:00

## 2017-11-08 RX ADMIN — PROPOFOL 30 MG: 10 INJECTION, EMULSION INTRAVENOUS at 10:04

## 2017-11-08 RX ADMIN — PROPOFOL 40 MG: 10 INJECTION, EMULSION INTRAVENOUS at 09:59

## 2017-11-08 RX ADMIN — PROPOFOL 30 MG: 10 INJECTION, EMULSION INTRAVENOUS at 10:02

## 2017-11-08 RX ADMIN — ERYTHROMYCIN 1 G: 5 OINTMENT OPHTHALMIC at 10:06

## 2017-11-08 RX ADMIN — TETRACAINE HYDROCHLORIDE 2 DROP: 5 SOLUTION OPHTHALMIC at 10:06

## 2017-11-08 RX ADMIN — LIDOCAINE HYDROCHLORIDE 50 MG: 20 INJECTION, SOLUTION INFILTRATION; PERINEURAL at 10:00

## 2017-11-08 RX ADMIN — SODIUM CHLORIDE, SODIUM LACTATE, POTASSIUM CHLORIDE, CALCIUM CHLORIDE: 600; 310; 30; 20 INJECTION, SOLUTION INTRAVENOUS at 09:14

## 2017-11-08 RX ADMIN — ACETAMINOPHEN 975 MG: 325 TABLET ORAL at 09:14

## 2017-11-08 RX ADMIN — SODIUM CHLORIDE, SODIUM LACTATE, POTASSIUM CHLORIDE, CALCIUM CHLORIDE: 600; 310; 30; 20 INJECTION, SOLUTION INTRAVENOUS at 09:51

## 2017-11-08 RX ADMIN — BUPIVACAINE HYDROCHLORIDE 3 ML: 5 INJECTION, SOLUTION PERINEURAL at 10:02

## 2017-11-08 NOTE — IP AVS SNAPSHOT
[facility]    [address] [place] [address]    Phone:  [phone]    Fax:  [phone]                                       After Visit Summary   11/8/2017    Oma Gaona    MRN: [id number]           After Visit Summary Signature Page     I have received my discharge instructions, and my questions have been answered. I have discussed any challenges I see with this plan with the nurse or doctor.    ..........................................................................................................................................  Patient/Patient Representative Signature      ..........................................................................................................................................  Patient Representative Print Name and Relationship to Patient    ..................................................               ................................................  Date                                            Time    ..........................................................................................................................................  Reviewed by Signature/Title    ...................................................              ..............................................  Date                                                            Time

## 2017-11-08 NOTE — DISCHARGE INSTRUCTIONS
Post-operative Instructions    Ophthalmic Plastic and Reconstructive Surgery  Samuel Abdi M.D.  Ceasar Avila M.D.  Oneida Joe M.D.    All instructions apply to the operated eye(s) or eyelid(s)      What to expect after surgery:    Thre will be some swelling, bruising, and likely a black eye (even into the lower eyelids and cheeks). Also expect crusting and discharge from the eye and/or incisions.     A small amount of surface bleeding is normal for the first 48 hours after surgery.    You may notice some bloody tears for the first few days after surgery. This is normal.    Your eye(s) and eyelid(s) may be painful and tender. This is normal after surgery. Use the pain medication as prescribed. If your pain does not improve despite the medication, contact the office.    Wound care and personal care:    If a patch or bandage has been placed, please leave this in place until seen in clinic. Prevent the bandage from getting wet.     Apply ice compresses 15 minutes on 15 minutes off while awake for the first 2 days after surgery, then switch to warm compresses 4 times a day until seen by your physician.     For warm packs you can place a cup of dry uncooked rice in a clean cotton sock. Place sock in microwave 30 seconds to one minute. Next place the warm sock into a plastic bag and wrap the bag with clean warm wet washcloth and place over operated eye.      You may shower or wash your hair the day after surgery. Do not bathe or go swimming for 1 week to prevent contamination of your wounds.    Do not apply make-up to the eyes or eyelids for 2 weeks after surgery.      Activity restrictions and driving:    Avoid heavy lifting, bending, exercise or strenuous activity for 1 week after surgery.    You may resume other activities and return to work as tolerated.    You may not resume driving until have you stopped using narcotic pain medications(such as Norco, Percocet, Tylenol #3).    Medications:    Restart  all your regular home medications and eye drops today. If you take Plavix or Aspirin on a regular basis, wait for 3 days after your surgery before restarting these in order to decrease the risk of bleeding complications.    Avoid aspirin and aspirin-like medications (Motrin, Aleve, Ibuprofen, Jessica-Paoli etc) for 5 days to reduce the risk of bleeding. You may take Tylenol (acetaminophen) for pain.    In addition to your home medications, take the following post-operative medications as prescribed by your physician:    Apply antibiotic ointment (erythromycin) to all sutures three times a day, and into the operated eye(s) at night.     Instill eye drops (Maxitrol) four times a day until the bottle finished.     Take 1 to 2 pain pills (norco or tylenol 3 as prescribed) as needed for pain up to every 4 hours.    The pain pills may make you drowsy. You must not drive a car, operate heavy machinery or drink alcohol while taking them.    The pain pills may cause constipation and nausea. Take them with some food to prevent a stomach upset. If you continue to experience nausea, call your physician.      WARNING: All the prescription pain medications listed above contain Tylenol (acetaminophen). You must not take more than 4,000 mg of acetaminophen per 24-hour period. This is equivalent to 6 tablets of Darvocet, 8 tablets of Vicodin, or 12 tablets of Norco, Percocet or Tylenol #3. If you take other over-the-counter medications containing acetaminophen, you must take the amount of acetaminophen into account and reduce the number of prescribed pain pills accordingly.    Contact information and follow-up:    Return to the Eye Clinic for a follow-up appointment with your physician as  scheduled. If no appointment has been scheduled, call [phone] for an  appointment with Dr. Abdi within 1 to 2 weeks from your date of surgery.      For severe pain, bleeding, or loss of vision, call the Eye Clinic at [phone].    After  hours or on weekends and holidays, call [phone] and ask to speak with the ophthalmologist on call.        [facility]  Home Care Following Anesthesia  For 24 hours after surgery:  1. Get plenty of rest.  A responsible adult must stay with you for at least 24 hours after you leave the surgery center.  2. Do not drive or use heavy equipment.  If you have weakness or tingling, don't drive or use heavy equipment until this feeling goes away.   3. Do not drink alcohol.   4. Avoid strenuous or risky activities.  Ask for help when climbing stairs.  5. You may feel lightheaded.  IF so, sit for a few minutes before standing.  Have someone help you get up.   6. If you have nausea (feel sick to your stomach): Drink only clear liquids such as apple juice, ginger ale, broth or 7-Up.  Rest may also help.  Be sure to drink enough fluids.  Move to a regular diet as you feel able.   7. You may have a slight fever.  Call the doctor if your fever is over 100 F (37.7 C) (taken under the tongue) or lasts longer than 24 hours.  8. You may have a dry mouth, a sore throat, muscle aches or trouble sleeping. These should go away after 24 hours.  9. Do not make important or legal decisions.     Tips for taking pain medications  To get the best pain relief possible, remember these points:    Take pain medications as directed, before pain becomes severe.    Pain medication can upset your stomach: taking it with food may help.    Constipation is a common side effect of pain medication. Drink plenty of  fluids.    Eat foods high in fiber. Take a stool softener if recommended by your doctor or pharmacist.    Do not drink alcohol, drive or operate machinery while taking pain medications.    Ask about other ways to control pain, such as with heat, ice or relaxation.    Tylenol/Acetaminophen Consumption  To help encourage the safe use of acetaminophen, the makers of TYLENOL  have lowered the maximum daily  dose for single-ingredient Extra Strength TYLENOL  (acetaminophen) products sold in the [place] from 8 pills per day (4,000 mg) to 6 pills per day (3,000 mg). The dosing interval has also changed from 2 pills every 4-6 hours to 2 pills every 6 hours.    If you feel your pain relief is insufficient, you may take Tylenol/Acetaminophen in addition to your narcotic pain medication.     Be careful not to exceed 3,000 mg of Tylenol/Acetaminophen in a 24 hour period from all sources.    If you are taking extra strength Tylenol/acetaminophen (500 mg), the maximum dose is 6 tablets in 24 hours.    If you are taking regular strength acetaminophen (325 mg), the maximum dose is 9 tablets in 24 hours.    Call a doctor for any of the followin. Signs of infection (fever, growing tenderness at the surgery site, a large amount of drainage or bleeding, severe pain, foul-smelling drainage, redness, swelling).  2. It has been over 8 to 10 hours since surgery and you are still not able to urinate (pass water).  3. Headache for over 24 hours.  4. Numbness, tingling or weakness the day after surgery (if you had spinal anesthesia).  Your doctor is:  Dr. Samuel Abdi, Ophthalmology: [phone]  Or dial [phone] and ask for the resident on call for:  Ophthalmology  For emergency care, call the:  [facility]:  [phone] (TTY for hearing impaired: [phone])

## 2017-11-08 NOTE — ANESTHESIA CARE TRANSFER NOTE
Patient: Oma Gaona    Procedure(s):  Bilateral Upper Blepharoplasty - Wound Class: I-Clean    Diagnosis: Dermatochalasis  Diagnosis Additional Information: No value filed.    Anesthesia Type:   MAC     Note:  Airway :Room Air  Patient transferred to:Phase II  Comments: Arrive Phase II, Stable, Airway Intact  96/56, 64,16,100%  All questions answered.  Handoff Report: Identifed the Patient, Identified the Reponsible Provider, Reviewed the pertinent medical history, Discussed the surgical course, Reviewed Intra-OP anesthesia mangement and issues during anesthesia, Set expectations for post-procedure period and Allowed opportunity for questions and acknowledgement of understanding      Vitals: (Last set prior to Anesthesia Care Transfer)    CRNA VITALS  11/8/2017 0954 - 11/8/2017 1027      11/8/2017             Pulse: 65    SpO2: 95 %    Resp Rate (set): 10                Electronically Signed By: KOSTAS Noel CRNA  November 8, 2017  10:27 AM

## 2017-11-08 NOTE — IP AVS SNAPSHOT
MRN:[id number]                      After Visit Summary   11/8/2017    Oma Gaona    MRN: [id number]           Thank you!     Thank you for choosing [facility] for your care. Our goal is always to provide you with excellent care. Hearing back from our patients is one way we can continue to improve our services. Please take a few minutes to complete the written survey that you may receive in the mail after you visit with us. Thank you!        Patient Information     Date Of Birth          1965        About your hospital stay     You were admitted on:  November 8, 2017 You last received care in the:  [facility]    You were discharged on:  November 8, 2017       Who to Call     For medical emergencies, please call 911.  For non-urgent questions about your medical care, please call your primary care provider or clinic, [phone]  For questions related to your surgery, please call your surgery clinic        Attending Provider     Provider Specialty    Samuel Abdi MD Ophthalmology       Primary Care Provider Office Phone # Fax #    Janey Goncalves [phone]      After Care Instructions     Discharge Medication Instructions       Do NOT take aspirin or medications containing NSAIDS for 72 hours after procedure.            Ice to affected area       Apply cold pack for 15 minutes on, 15 minutes off, for 48 hours while awake.                  Your next 10 appointments already scheduled     Nov 27, 2017  1:15 PM CST   (Arrive by 1:00 PM)   Post-Op with Samuel Abdi MD   [facility] ([email or web address] and [facility] [place])    [address] [place] [address] [phone]              Further instructions from your care team       Post-operative Instructions    Ophthalmic Plastic and Reconstructive Surgery  Samuel Abdi M.D.  Ceasar Avila M.D.  Oneida Joe M.D.    All instructions apply to the  operated eye(s) or eyelid(s)      What to expect after surgery:    Thre will be some swelling, bruising, and likely a black eye (even into the lower eyelids and cheeks). Also expect crusting and discharge from the eye and/or incisions.     A small amount of surface bleeding is normal for the first 48 hours after surgery.    You may notice some bloody tears for the first few days after surgery. This is normal.    Your eye(s) and eyelid(s) may be painful and tender. This is normal after surgery. Use the pain medication as prescribed. If your pain does not improve despite the medication, contact the office.    Wound care and personal care:    If a patch or bandage has been placed, please leave this in place until seen in clinic. Prevent the bandage from getting wet.     Apply ice compresses 15 minutes on 15 minutes off while awake for the first 2 days after surgery, then switch to warm compresses 4 times a day until seen by your physician.     For warm packs you can place a cup of dry uncooked rice in a clean cotton sock. Place sock in microwave 30 seconds to one minute. Next place the warm sock into a plastic bag and wrap the bag with clean warm wet washcloth and place over operated eye.      You may shower or wash your hair the day after surgery. Do not bathe or go swimming for 1 week to prevent contamination of your wounds.    Do not apply make-up to the eyes or eyelids for 2 weeks after surgery.      Activity restrictions and driving:    Avoid heavy lifting, bending, exercise or strenuous activity for 1 week after surgery.    You may resume other activities and return to work as tolerated.    You may not resume driving until have you stopped using narcotic pain medications(such as Norco, Percocet, Tylenol #3).    Medications:    Restart all your regular home medications and eye drops today. If you take Plavix or Aspirin on a regular basis, wait for 3 days after your surgery before restarting these in order to  decrease the risk of bleeding complications.    Avoid aspirin and aspirin-like medications (Motrin, Aleve, Ibuprofen, Jessica-Jarreau etc) for 5 days to reduce the risk of bleeding. You may take Tylenol (acetaminophen) for pain.    In addition to your home medications, take the following post-operative medications as prescribed by your physician:    Apply antibiotic ointment (erythromycin) to all sutures three times a day, and into the operated eye(s) at night.     Instill eye drops (Maxitrol) four times a day until the bottle finished.     Take 1 to 2 pain pills (norco or tylenol 3 as prescribed) as needed for pain up to every 4 hours.    The pain pills may make you drowsy. You must not drive a car, operate heavy machinery or drink alcohol while taking them.    The pain pills may cause constipation and nausea. Take them with some food to prevent a stomach upset. If you continue to experience nausea, call your physician.      WARNING: All the prescription pain medications listed above contain Tylenol (acetaminophen). You must not take more than 4,000 mg of acetaminophen per 24-hour period. This is equivalent to 6 tablets of Darvocet, 8 tablets of Vicodin, or 12 tablets of Norco, Percocet or Tylenol #3. If you take other over-the-counter medications containing acetaminophen, you must take the amount of acetaminophen into account and reduce the number of prescribed pain pills accordingly.    Contact information and follow-up:    Return to the Eye Clinic for a follow-up appointment with your physician as  scheduled. If no appointment has been scheduled, call [phone] for an  appointment with Dr. Abdi within 1 to 2 weeks from your date of surgery.      For severe pain, bleeding, or loss of vision, call the Eye Clinic at [phone].    After hours or on weekends and holidays, call [phone] and ask to speak with the ophthalmologist on call.        [facility]  Home Care  Following Anesthesia  For 24 hours after surgery:  1. Get plenty of rest.  A responsible adult must stay with you for at least 24 hours after you leave the surgery center.  2. Do not drive or use heavy equipment.  If you have weakness or tingling, don't drive or use heavy equipment until this feeling goes away.   3. Do not drink alcohol.   4. Avoid strenuous or risky activities.  Ask for help when climbing stairs.  5. You may feel lightheaded.  IF so, sit for a few minutes before standing.  Have someone help you get up.   6. If you have nausea (feel sick to your stomach): Drink only clear liquids such as apple juice, ginger ale, broth or 7-Up.  Rest may also help.  Be sure to drink enough fluids.  Move to a regular diet as you feel able.   7. You may have a slight fever.  Call the doctor if your fever is over 100 F (37.7 C) (taken under the tongue) or lasts longer than 24 hours.  8. You may have a dry mouth, a sore throat, muscle aches or trouble sleeping. These should go away after 24 hours.  9. Do not make important or legal decisions.     Tips for taking pain medications  To get the best pain relief possible, remember these points:    Take pain medications as directed, before pain becomes severe.    Pain medication can upset your stomach: taking it with food may help.    Constipation is a common side effect of pain medication. Drink plenty of  fluids.    Eat foods high in fiber. Take a stool softener if recommended by your doctor or pharmacist.    Do not drink alcohol, drive or operate machinery while taking pain medications.    Ask about other ways to control pain, such as with heat, ice or relaxation.    Tylenol/Acetaminophen Consumption  To help encourage the safe use of acetaminophen, the makers of TYLENOL  have lowered the maximum daily dose for single-ingredient Extra Strength TYLENOL  (acetaminophen) products sold in the [place] from 8 pills per day (4,000 mg) to 6 pills per day (3,000 mg). The dosing  "interval has also changed from 2 pills every 4-6 hours to 2 pills every 6 hours.    If you feel your pain relief is insufficient, you may take Tylenol/Acetaminophen in addition to your narcotic pain medication.     Be careful not to exceed 3,000 mg of Tylenol/Acetaminophen in a 24 hour period from all sources.    If you are taking extra strength Tylenol/acetaminophen (500 mg), the maximum dose is 6 tablets in 24 hours.    If you are taking regular strength acetaminophen (325 mg), the maximum dose is 9 tablets in 24 hours.    Call a doctor for any of the followin. Signs of infection (fever, growing tenderness at the surgery site, a large amount of drainage or bleeding, severe pain, foul-smelling drainage, redness, swelling).  2. It has been over 8 to 10 hours since surgery and you are still not able to urinate (pass water).  3. Headache for over 24 hours.  4. Numbness, tingling or weakness the day after surgery (if you had spinal anesthesia).  Your doctor is:  Dr. Samuel Abdi, Ophthalmology: [phone]  Or dial [phone] and ask for the resident on call for:  Ophthalmology  For emergency care, call the:  [facility]:  [phone] (TTY for hearing impaired: [phone])                Pending Results     No orders found from 2017 to 2017.            Admission Information     Date & Time Provider Department Dept. Phone    2017 Samuel Abdi MD [facility] [phone]      Your Vitals Were     Blood Pressure Temperature Respirations Height Weight Pulse Oximetry    107/67 98.4  F (36.9  C) (Oral) 16 1.689 m (5' 6.5\") 63.5 kg (140 lb) 96%    BMI (Body Mass Index)                   22.26 kg/m2           [employer] Information     [employer] gives you secure access to your electronic health record. If you see a primary care provider, you can also send messages to your care team and make appointments. If you have questions, please call your primary " [facility] clinic.  If you do not have a primary care provider, please call [phone] and they will assist you.      [employer] is an electronic gateway that provides easy, online access to your medical records. With [employer], you can request a clinic appointment, read your test results, renew a prescription or communicate with your care team.     To access your existing account, please contact your [facility] or call [phone] for assistance.        Care EveryWhere ID     This is your Care EveryWhere ID. This could be used by other organizations to access your [place] medical records  [id number]        Equal Access to Services     DALY [facility] Hospital[facility] : Hadlu Irving, jenna nath, marah martell, alexandr garza . So [facility] [phone].    ATENCIÓN: Si habla español, tiene a [place] disposición servicios gratuitos de asistencia lingüística. Llame al [phone].    We comply with applicable federal civil rights laws and [place] laws. We do not discriminate on the basis of race, color, national origin, age, disability, sex, sexual orientation, or gender identity.               Review of your medicines      START taking        Dose / Directions    erythromycin ophthalmic ointment   Commonly known as:  ROMYCIN   Used for:  Postoperative eye state        Apply to skin incisions three times daily and into the eye at bedtime.   Quantity:  3.5 g   Refills:  0       HYDROcodone-acetaminophen 5-325 MG per tablet   Commonly known as:  NORCO   Used for:  Postoperative eye state        Dose:  1 tablet   Take 1 tablet by mouth every 6 hours as needed for pain Maximum of 4000 mg of acetaminophen in 24 hours.   Quantity:  10 tablet   Refills:  0       ondansetron 4 MG tablet   Commonly known as:  ZOFRAN   Used for:  Postoperative eye state        Dose:  4 mg   Take 1 tablet (4 mg) by mouth every 8 hours as needed for nausea   Quantity:  10 tablet    Refills:  0         CONTINUE these medicines which have NOT CHANGED        Dose / Directions    ADVIL PO        Dose:  200 mg   Take 200 mg by mouth every 8 hours as needed   Refills:  0       levonorgestrel-ethinyl estradiol 0.15-30 MG-MCG per tablet   Commonly known as:  NORDETTE        Dose:  1 tablet   Take 1 tablet by mouth daily   Refills:  0            Where to get your medicines      These medications were sent to [place], [place] - [address] [facility] [address]  [address], [facility] [address]    Hours:  TRANSPLANT PHONE NUMBER [phone] Phone:  [phone]     erythromycin ophthalmic ointment    ondansetron 4 MG tablet         Some of these will need a paper prescription and others can be bought over the counter. Ask your nurse if you have questions.     Bring a paper prescription for each of these medications     HYDROcodone-acetaminophen 5-325 MG per tablet                Protect others around you: Learn how to safely use, store and throw away your medicines at www.disposemymeds.org.             Medication List: This is a list of all your medications and when to take them. Check marks below indicate your daily home schedule. Keep this list as a reference.      Medications           Morning Afternoon Evening Bedtime As Needed    ADVIL PO   Take 200 mg by mouth every 8 hours as needed                                erythromycin ophthalmic ointment   Commonly known as:  ROMYCIN   Apply to skin incisions three times daily and into the eye at bedtime.   Last time this was given:  1 g on 11/8/2017 10:06 AM                                HYDROcodone-acetaminophen 5-325 MG per tablet   Commonly known as:  NORCO   Take 1 tablet by mouth every 6 hours as needed for pain Maximum of 4000 mg of acetaminophen in 24 hours.                                levonorgestrel-ethinyl estradiol 0.15-30 MG-MCG per tablet   Commonly known as:  NORDETTE   Take 1 tablet by  mouth daily                                ondansetron 4 MG tablet   Commonly known as:  ZOFRAN   Take 1 tablet (4 mg) by mouth every 8 hours as needed for nausea

## 2017-11-08 NOTE — OP NOTE
PREOPERATIVE DIAGNOSIS: Bilateral upper eyelid dermatochalasis.   POSTOPERATIVE DIAGNOSIS: Bilateral upper eyelid dermatochalasis.   PROCEDURE: Bilateral upper blepharoplasty.   SURGEON: Samuel Abdi MD.   ASSISTANT: Oneida Joe MD    ANESTHESIA: Monitored with local infiltration of a 50/50 mixture of 2% lidocaine with epinephrine and 0.5% Marcaine.   COMPLICATIONS: None.   ESTIMATED BLOOD LOSS: Less than 5 mL.   HISTORY: Oma Gaona  presented with upper lid dermatochalasis leading to mechanical ptosis of the upper lids, blocking the superior visual field and interfering with  activities of daily living. After the risks, benefits and alternatives to the proposed procedure were explained, informed consent was obtained.   DESCRIPTION OF PROCEDURE: Oma Gaona was brought to the operating room and placed supine on the operating table. Intravenous sedation was given. The upper lid crease and excess upper eyelid skin was marked with marking pen and infiltrated with local anesthetic. The area was prepped and draped in the typical fashion. Attention was directed to the right side. Skin was incised following marked lines. Skin flap was excised with a high temperature cautery. A row of cautery was placed in the orbicularis along the inferior incision line.   The orbicularis and septum were opened nasally. A small amount of the nasal fat pad was excised with the cautery. The orbicularis was divided laterally with the cautery. Multiple 5-0 Vicryl sutures were used to secure the superior edge of orbicularis to the arcus marginalis to support the lateral brow.Hemostasis was obtained and the skin closed with running 6-0 plain gut suture. Attention was directed to the left side where the same procedure was performed.  Ophthalmic antibiotic ointment was applied to the eyelids and into the eyes. Oma Gaona tolerated the procedure well and left the operating room in stable condition.     Samuel Abdi,  MD

## 2017-11-08 NOTE — ANESTHESIA PREPROCEDURE EVALUATION
Anesthesia Evaluation     . Pt has had prior anesthetic. Type: General and MAC    History of anesthetic complications   - PONV        ROS/MED HX    ENT/Pulmonary:  - neg pulmonary ROS     Neurologic:  - neg neurologic ROS     Cardiovascular: Comment: Diagnosis of systemic lupus erythematosus.      (+) ----. : . . . :. . No previous cardiac testing       METS/Exercise Tolerance:  >4 METS   Hematologic:  - neg hematologic  ROS       Musculoskeletal:  - neg musculoskeletal ROS       GI/Hepatic:  - neg GI/hepatic ROS       Renal/Genitourinary:  - ROS Renal section negative       Endo:  - neg endo ROS       Psychiatric:  - neg psychiatric ROS       Infectious Disease:  - neg infectious disease ROS       Malignancy:      - no malignancy   Other:    - neg other ROS                 Physical Exam  Normal systems: pulmonary and dental    Airway   Mallampati: I  TM distance: >3 FB  Neck ROM: full    Dental     Cardiovascular   Rhythm and rate: regular and normal      Pulmonary                        Lab / Radiology Results:   Reviewed current labs when avail, see EMR for details.      BMP:  No results for input(s): NA, POTASSIUM, CHLORIDE, CO2, BUN, CR, GLC, SUDHA in the last 32688 hours.    Invalid input(s): MG    LFTs:   No results for input(s): PROTTOTAL, ALBUMIN, BILITOTAL, ALKPHOS, AST, ALT, BILIDIRECT in the last 47401 hours.    CBC:   No results for input(s): WBC, HGB, PLT in the last 37473 hours.    Coags:  No results for input(s): INR, PTT, FIBR in the last 94410 hours.    Blood Bank:  No results found for: ABO, RH, AS    Studies:  See EMR for current studies, reviewed when available.      Anesthesia Plan      History & Physical Review      ASA Status:  2 .    NPO Status:  > 8 hours    Plan for MAC with Intravenous induction. Maintenance will be TIVA.  Reason for MAC:  Deep or markedly invasive procedure (G8)  PONV prophylaxis:  Ondansetron (or other 5HT-3) and Dexamethasone or Solumedrol       Postoperative  Care  Postoperative pain management:  Multi-modal analgesia.      Consents  Anesthetic plan, risks, benefits and alternatives discussed with:  Patient.  Use of blood products discussed: No .   .      Elfego Mckeon MD  Anesthesiologist  9:27 AM  November 8, 2017                        .

## 2017-11-08 NOTE — ANESTHESIA POSTPROCEDURE EVALUATION
Patient: Oma Gaona    Procedure(s):  Bilateral Upper Blepharoplasty - Wound Class: I-Clean    Diagnosis:Dermatochalasis  Diagnosis Additional Information: No value filed.    Anesthesia Type:  MAC    Note:  Anesthesia Post Evaluation    Patient location during evaluation: Phase 2  Patient participation: Able to fully participate in evaluation  Level of consciousness: awake and alert  Pain management: adequate  Airway patency: patent  Cardiovascular status: acceptable and hemodynamically stable  Respiratory status: acceptable  Hydration status: acceptable  PONV: none     Anesthetic complications: None          Last vitals:  Vitals:    11/08/17 0800 11/08/17 1030   BP: 106/77 96/56   Resp: 16 16   Temp: 36.7  C (98  F) 36.5  C (97.7  F)   SpO2: 98% 99%         Electronically Signed By: Elfego Mckeon MD  November 8, 2017  10:40 AM

## 2017-11-27 ENCOUNTER — OFFICE VISIT (OUTPATIENT)
Dept: OPHTHALMOLOGY | Facility: CLINIC | Age: 52
End: 2017-11-27

## 2017-11-27 DIAGNOSIS — Z98.890 POSTOPERATIVE EYE STATE: Primary | ICD-10-CM

## 2017-11-27 ASSESSMENT — TONOMETRY
OS_IOP_MMHG: 13
OD_IOP_MMHG: 14
IOP_METHOD: ICARE

## 2017-11-27 ASSESSMENT — EXTERNAL EXAM - LEFT EYE: OS_EXAM: NORMAL

## 2017-11-27 ASSESSMENT — VISUAL ACUITY
OS_SC: 20/40
OS_SC+: +2
OD_SC+: -2
METHOD: SNELLEN - LINEAR
OD_SC: 20/20

## 2017-11-27 ASSESSMENT — SLIT LAMP EXAM - LIDS
COMMENTS: INCISIONS C/D/I
COMMENTS: INCISIONS C/D/I

## 2017-11-27 ASSESSMENT — EXTERNAL EXAM - RIGHT EYE: OD_EXAM: NORMAL

## 2017-11-27 NOTE — MR AVS SNAPSHOT
After Visit Summary   11/27/2017    Oma Gaona    MRN: [id number]           Patient Information     Date Of Birth          1965        Visit Information        Provider Department      11/27/2017 1:15 PM Samuel Abdi MD [facility]        Today's Diagnoses     Postoperative eye state    -  1       Follow-ups after your visit        Follow-up notes from your care team     Return in about 2 months (around 1/27/2018) for RETURN OCULOPLASTICS.      Your next 10 appointments already scheduled     Jan 08, 2018  3:15 PM CST   (Arrive by 3:00 PM)   Post-Op with MD AMISH Aguilera [facility] Ophthalmology ([facility] [place])    [address] [address]  [address] [facility] [address] [phone]              Who to contact     Please call your clinic at [phone] to:    Ask questions about your health    Make or cancel appointments    Discuss your medicines    Learn about your test results    Speak to your doctor   If you have compliments or concerns about an experience at your clinic, or if you wish to file a complaint, please contact [facility] Patient Relations at [phone] or email us at [email or web address]         Additional Information About Your Visit        MyChart Information     [employer] gives you secure access to your electronic health record. If you see a primary care provider, you can also send messages to your care team and make appointments. If you have questions, please call your primary care clinic.  If you do not have a primary care provider, please call [phone] and they will assist you.      [employer] is an electronic gateway that provides easy, online access to your medical records. With [employer], you can request a clinic appointment, read your test results, renew a prescription or communicate with your care team.     To access your existing account, please contact your [facility] or call [phone] for assistance.        Care EveryWhere ID     This is your Care EveryWhere ID. This could be used by other organizations to access your [place] medical records  [id number]         Blood Pressure from Last 3 Encounters:   11/08/17 107/67   10/25/17 100/65   02/15/16 114/66    Weight from Last 3 Encounters:   11/08/17 63.5 kg (140 lb)   10/25/17 64.6 kg (142 lb 8 oz)   09/11/17 61.2 kg (135 lb)              Today, you had the following     No orders found for display       Primary Care Provider Office Phone # Fax #    Janey Goncalves [phone]       [address] [address]        Equal Access to Services     DALY AGUILA : Hadii brenda garcia hadasho Sojose g, waaxda luqadaha, qaybta kaalmada adeegyada, alexandr garza . So [facility] [phone].    ATENCIÓN: Si habla español, tiene a [place] disposición servicios gratuitos de asistencia lingüística. Desiree al [phone].    We comply with applicable federal civil rights laws and [place] laws. We do not discriminate on the basis of race, color, national origin, age, disability, sex, sexual orientation, or gender identity.            Thank you!     Thank you for choosing [facility] OPHTH[facility]  for your care. Our goal is always to provide you with excellent care. Hearing back from our patients is one way we can continue to improve our services. Please take a few minutes to complete the written survey that you may receive in the mail after your visit with us. Thank you!             Your Updated Medication List - Protect others around you: Learn how to safely use, store and throw away your medicines at www.disposemymeds.org.          This list is accurate as of: 11/27/17  1:58 PM.  Always use your most recent med list.                   Brand Name Dispense Instructions for use Diagnosis    ADVIL PO      Take 200 mg by mouth every 8 hours as needed        erythromycin ophthalmic  ointment    ROMYCIN    3.5 g    Apply to skin incisions three times daily and into the eye at bedtime.    Postoperative eye state       HYDROcodone-acetaminophen 5-325 MG per tablet    NORCO    10 tablet    Take 1 tablet by mouth every 6 hours as needed for pain Maximum of 4000 mg of acetaminophen in 24 hours.    Postoperative eye state       levonorgestrel-ethinyl estradiol 0.15-30 MG-MCG per tablet    NORDETTE     Take 1 tablet by mouth daily        ondansetron 4 MG tablet    ZOFRAN    10 tablet    Take 1 tablet (4 mg) by mouth every 8 hours as needed for nausea    Postoperative eye state

## 2017-11-27 NOTE — PROGRESS NOTES
Doing well postop bilateral upper eyelid blepharoplasty . Happy with outcome.  - ointment to incision three times daily, massage to incisions  - continue warm packs  Return to clinic in 2 months with Birgit Joe MD  Ophthalmic Plastic and Reconstructive Surgery Fellow    Attending Physician Attestation:  I have seen and examined this patient.  I have confirmed and edited as necessary the chief complaint(s), history of present illness, review of systems, relevant history, and examination findings as documented by others.  I have personally reviewed the relevant tests, images, and reports as documented above.  I have confirmed and edited as necessary the assessment and plan and agree with this note.    - Samuel Abdi MD 1:28 PM 11/27/2017

## 2017-11-27 NOTE — NURSING NOTE
Chief Complaints and History of Present Illnesses   Patient presents with     Post Op (Ophthalmology) Both Eyes     s/p Bilateral upper blepharoplasty     HPI    Affected eye(s):  Both   Symptoms:     No blurred vision      Frequency:  Constant       Do you have eye pain now?:  No      Comments:  2.5 week postop s/p Bilateral upper blepharoplasty on 11/8/2017. Pt states lid position looks good, is a little concerned of a little bump on the right upper lid. No pain. No drops.    Aj Hernandez COT 1:16 PM November 27, 2017

## 2018-01-08 ENCOUNTER — OFFICE VISIT (OUTPATIENT)
Dept: OPHTHALMOLOGY | Facility: CLINIC | Age: 53
End: 2018-01-08
Payer: COMMERCIAL

## 2018-01-08 DIAGNOSIS — Z98.890 POSTOPERATIVE EYE STATE: Primary | ICD-10-CM

## 2018-01-08 ASSESSMENT — VISUAL ACUITY
OS_SC+: -2
OD_SC+: -2
OD_SC: 20/20
METHOD: SNELLEN - LINEAR
OS_SC: 20/40

## 2018-01-08 ASSESSMENT — EXTERNAL EXAM - LEFT EYE: OS_EXAM: NORMAL

## 2018-01-08 ASSESSMENT — TONOMETRY
OD_IOP_MMHG: 9
IOP_METHOD: ICARE
OS_IOP_MMHG: 10

## 2018-01-08 ASSESSMENT — SLIT LAMP EXAM - LIDS
COMMENTS: INCISION WELL HEALED
COMMENTS: INCISION WELL HEALED

## 2018-01-08 ASSESSMENT — EXTERNAL EXAM - RIGHT EYE: OD_EXAM: NORMAL

## 2018-01-08 NOTE — PROGRESS NOTES
Chief Complaints and History of Present Illnesses   Patient presents with     Post Op (Ophthalmology) Both Eyes     postop bilateral upper eyelid blepharoplasty     Doing well 2 months post bilateral upper eyelids blepharoplasty.         Oma Gaona is a 52 year old female with the following diagnoses:   1. Postoperative eye state       Well healed post bilateral upper eyelids blepharoplasty. RV here as needed.  Return for general eye exam and manifest refraction.       Oneida Joe MD  Ophthalmic Plastic and Reconstructive Surgery Fellow    Attending Physician Attestation:  I have seen and examined this patient.  I have confirmed and edited as necessary the chief complaint(s), history of present illness, review of systems, relevant history, and examination findings as documented by others.  I have personally reviewed the relevant tests, images, and reports as documented above.  I have confirmed and edited as necessary the assessment and plan and agree with this note.    - Samuel Abdi MD 3:33 PM 1/8/2018

## 2018-01-08 NOTE — MR AVS SNAPSHOT
After Visit Summary   1/8/2018    Oma Gaona    MRN: [id number]           Patient Information     Date Of Birth          1965        Visit Information        Provider Department      1/8/2018 3:15 PM Samuel Abdi MD [facility] Ophthalmology        Today's Diagnoses     Postoperative eye state    -  1       Follow-ups after your visit        Follow-up notes from your care team     Return for OPTOMETRY- GENERAL EYE EXAM.      Your next 10 appointments already scheduled     Jan 16, 2018  9:00 AM CST   (Arrive by 8:45 AM)   [facility] GENERAL with CANDY Quiroz [facility] Ophthalmology ([facility])    [address] [place] [id number] [phone]              Who to contact     Please call your clinic at [phone] to:    Ask questions about your health    Make or cancel appointments    Discuss your medicines    Learn about your test results    Speak to your doctor   If you have compliments or concerns about an experience at your clinic, or if you wish to file a complaint, please contact [facility] Physicians Patient Relations at [phone] or email us at [email or web address]         Additional Information About Your Visit        MyChart Information     [employer] gives you secure access to your electronic health record. If you see a primary care provider, you can also send messages to your care team and make appointments. If you have questions, please call your primary care clinic.  If you do not have a primary care provider, please call [phone] and they will assist you.      [employer] is an electronic gateway that provides easy, online access to your medical records. With [email or web address], you can request a clinic appointment, read your test results, renew a prescription or communicate with your care team.     To access your existing account, please contact your [facility] Clinic or call  [phone] for assistance.        Care EveryWhere ID     This is your Care EveryWhere ID. This could be used by other organizations to access your [place] medical records  [id number]         Blood Pressure from Last 3 Encounters:   11/08/17 107/67   10/25/17 100/65   02/15/16 114/66    Weight from Last 3 Encounters:   11/08/17 63.5 kg (140 lb)   10/25/17 64.6 kg (142 lb 8 oz)   09/11/17 61.2 kg (135 lb)              Today, you had the following     No orders found for display       Primary Care Provider Office Phone # Fax #    Janey Goncalves [phone] [address] [address]        Equal Access to Services     DALY AGUILA : Hadii aad ku hadasho Soomaali, waaxda luqadaha, qaybta kaalmada adeegyada, alexandr garza . So [facility] [phone].    ATENCIÓN: Si habla español, tiene a [place] disposición servicios gratuitos de asistencia lingüística. Llame al [phone].    We comply with applicable federal civil rights laws and [place] laws. We do not discriminate on the basis of race, color, national origin, age, disability, sex, sexual orientation, or gender identity.            Thank you!     Thank you for choosing [facility] OPHTHALMOLOGY  for your care. Our goal is always to provide you with excellent care. Hearing back from our patients is one way we can continue to improve our services. Please take a few minutes to complete the written survey that you may receive in the mail after your visit with us. Thank you!             Your Updated Medication List - Protect others around you: Learn how to safely use, store and throw away your medicines at www.disposemymeds.org.          This list is accurate as of: 1/8/18  4:09 PM.  Always use your most recent med list.                   Brand Name Dispense Instructions for use Diagnosis    ADVIL PO      Take 200 mg by mouth every 8 hours as needed        erythromycin ophthalmic ointment    ROMYCIN    3.5  g    Apply to skin incisions three times daily and into the eye at bedtime.    Postoperative eye state       HYDROcodone-acetaminophen 5-325 MG per tablet    NORCO    10 tablet    Take 1 tablet by mouth every 6 hours as needed for pain Maximum of 4000 mg of acetaminophen in 24 hours.    Postoperative eye state       levonorgestrel-ethinyl estradiol 0.15-30 MG-MCG per tablet    NORDETTE     Take 1 tablet by mouth daily        ondansetron 4 MG tablet    ZOFRAN    10 tablet    Take 1 tablet (4 mg) by mouth every 8 hours as needed for nausea    Postoperative eye state

## 2018-01-08 NOTE — NURSING NOTE
Chief Complaints and History of Present Illnesses   Patient presents with     Post Op (Ophthalmology) Both Eyes     postop bilateral upper eyelid blepharoplasty     HPI    Affected eye(s):  Both   Symptoms:     No blurred vision      Frequency:  Constant       Do you have eye pain now?:  No      Comments:  postop bilateral upper eyelid blepharoplasty 11/8/17. She states that vision has not changed since surgery. She has had ongoing issues with distance, and wonders if she can get glasses now. OTC readers 1.00 which work. Patient denies eye pain or irritation. Does not use any eye drops. Still has some stitches she would like removed.     Naa Youssef COT 3:09 PM January 8, 2018

## 2018-01-16 ENCOUNTER — OFFICE VISIT (OUTPATIENT)
Dept: OPHTHALMOLOGY | Facility: CLINIC | Age: 53
End: 2018-01-16
Payer: COMMERCIAL

## 2018-01-16 DIAGNOSIS — Z98.890 POSTOPERATIVE EYE STATE: ICD-10-CM

## 2018-01-16 DIAGNOSIS — H52.4 PRESBYOPIA: Primary | ICD-10-CM

## 2018-01-16 DIAGNOSIS — Q12.0 CONGENITAL SUTURAL CATARACT: ICD-10-CM

## 2018-01-16 ASSESSMENT — CUP TO DISC RATIO
OD_RATIO: 0.1
OS_RATIO: 0.1

## 2018-01-16 ASSESSMENT — EXTERNAL EXAM - RIGHT EYE: OD_EXAM: NORMAL

## 2018-01-16 ASSESSMENT — CONF VISUAL FIELD
OD_NORMAL: 1
OS_NORMAL: 1

## 2018-01-16 ASSESSMENT — TONOMETRY
OS_IOP_MMHG: 18
IOP_METHOD: ICARE
OD_IOP_MMHG: 18

## 2018-01-16 ASSESSMENT — EXTERNAL EXAM - LEFT EYE: OS_EXAM: NORMAL

## 2018-01-16 ASSESSMENT — SLIT LAMP EXAM - LIDS
COMMENTS: INCISION WELL HEALED
COMMENTS: INCISION WELL HEALED

## 2018-01-16 ASSESSMENT — REFRACTION_MANIFEST
OS_AXIS: 064
OD_ADD: +1.75
OS_CYLINDER: +0.50
OS_SPHERE: -1.00
OD_SPHERE: PLANO
OS_ADD: +1.75

## 2018-01-16 ASSESSMENT — VISUAL ACUITY
OS_SC: J5
OS_PH_SC: 20/30
OS_SC: 20/40
METHOD: SNELLEN - LINEAR
OD_SC: 20/15
OD_SC: J16

## 2018-01-16 NOTE — NURSING NOTE
Chief Complaints and History of Present Illnesses   Patient presents with     Follow Up For     Routine eye exam     HPI    Affected eye(s):  Both   Symptoms:        Duration:  1 year   Frequency:  Constant       Do you have eye pain now?:  No      Comments:  Pt. States that VA has been blurry over the last year.  Having a difficult time with distance LE and blurry near with RE.  Occasional tearing RE.  No c/o comfort LE.  Yenni Ornelas COT 9:18 AM January 16, 2018

## 2018-01-16 NOTE — PROGRESS NOTES
Assessment/Plan  (H52.4) Presbyopia  (primary encounter diagnosis)  Comment: Anisometropia, with presbyopia OU  Plan: REFRACTION [58767]         Educated patient on condition and clinical findings. Dispensed spectacle prescription for full time wear. Educated patient on possibility of adaptation period, if symptoms do not improve return to clinic for further testing.    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    (Z98.890) Postoperative eye state  Comment: Stable  Plan:  Monitor.    Return to clinic in 1 year for comprehensive eye exam.    Complete documentation of historical and exam elements from today's encounter can  be found in the full encounter summary report (not reduplicated in this progress  note). I personally obtained the chief complaint(s) and history of present illness. I  confirmed and edited as necessary the review of systems, past medical/surgical  history, family history, social history, and examination findings as documented by  others; and I examined the patient myself. I personally reviewed the relevant tests,  images, and reports as documented above. I formulated and edited as necessary the  assessment and plan and discussed the findings and management plan with the  patient and family.    Samuel Cooley, CANDY, FAAO

## 2018-01-16 NOTE — MR AVS SNAPSHOT
After Visit Summary   1/16/2018    Oma Gaona    MRN: [id number]           Patient Information     Date Of Birth          1965        Visit Information        Provider Department      1/16/2018 9:00 AM [facility]        Today's Diagnoses     Presbyopia    -  1    Congenital sutural cataract        Postoperative eye state           Follow-ups after your visit        Follow-up notes from your care team     Return in about 1 year (around 1/16/2019) for Comprehensive Eye Exam.      Who to contact     Please call your clinic at [phone] to:    Ask questions about your health    Make or cancel appointments    Discuss your medicines    Learn about your test results    Speak to your doctor   If you have compliments or concerns about an experience at your clinic, or if you wish to file a complaint, please contact [facility] Patient Relations at [phone] or email us at [email or web address]         Additional Information About Your Visit        MyChart Information     [employer] gives you secure access to your electronic health record. If you see a primary care provider, you can also send messages to your care team and make appointments. If you have questions, please call your primary care clinic.  If you do not have a primary care provider, please call [phone] and they will assist you.      [employer] is an electronic gateway that provides easy, online access to your medical records. With [employer], you can request a clinic appointment, read your test results, renew a prescription or communicate with your care team.     To access your existing account, please contact your [facility] Clinic or call [phone] for assistance.        Care EveryWhere ID     This is your Care EveryWhere ID. This could be used by other organizations to access your [place] medical records  [id number]         Blood Pressure  from Last 3 Encounters:   11/08/17 107/67   10/25/17 100/65   02/15/16 114/66    Weight from Last 3 Encounters:   11/08/17 63.5 kg (140 lb)   10/25/17 64.6 kg (142 lb 8 oz)   09/11/17 61.2 kg (135 lb)              We Performed the Following     REFRACTION [15550]        Primary Care Provider Office Phone # Fax #    Janey Goncalves [phone] [address] [facility] [address] [place] [address]        Equal Access to Services     [facility]: Hadii aad ku hadasho Soomaali, waaxda luqadaha, qaybta kaalmada adeamadoyamyra, alexandr garza . So [facility] [phone].    ATENCIÓN: Si habla español, tiene a [place] disposición servicios gratuitos de asistencia lingüística. [facility] [phone].    We comply with applicable federal civil rights laws and [place] laws. We do not discriminate on the basis of race, color, national origin, age, disability, sex, sexual orientation, or gender identity.            Thank you!     Thank you for choosing [facility] OPHTHALMOLOGY  for your care. Our goal is always to provide you with excellent care. Hearing back from our patients is one way we can continue to improve our services. Please take a few minutes to complete the written survey that you may receive in the mail after your visit with us. Thank you!             Your Updated Medication List - Protect others around you: Learn how to safely use, store and throw away your medicines at www.disposemymeds.org.          This list is accurate as of: 1/16/18 10:47 AM.  Always use your most recent med list.                   Brand Name Dispense Instructions for use Diagnosis    ADVIL PO      Take 200 mg by mouth every 8 hours as needed        erythromycin ophthalmic ointment    ROMYCIN    3.5 g    Apply to skin incisions three times daily and into the eye at bedtime.    Postoperative eye state       HYDROcodone-acetaminophen 5-325 MG per tablet    NORCO    10 tablet    Take 1 tablet by mouth every 6 hours as  needed for pain Maximum of 4000 mg of acetaminophen in 24 hours.    Postoperative eye state       levonorgestrel-ethinyl estradiol 0.15-30 MG-MCG per tablet    NORDETTE     Take 1 tablet by mouth daily        ondansetron 4 MG tablet    ZOFRAN    10 tablet    Take 1 tablet (4 mg) by mouth every 8 hours as needed for nausea    Postoperative eye state

## 2018-03-22 ENCOUNTER — TELEPHONE (OUTPATIENT)
Dept: OPHTHALMOLOGY | Facility: CLINIC | Age: 53
End: 2018-03-22

## 2018-03-22 NOTE — TELEPHONE ENCOUNTER
S/p blepharoplasty with dr. Abdi in November 2017    Last eye exam with dr. Cooley 1-16-18    Left eye swollen lump on brow line-- eye socket  Pt has been massaging and has not broke up in past 2.5 month    Having some tenderness to touch  No redness  No warmth  No pain otherwise   does seem larger in past couple weeks    Was told may by able to excise in clinic if does not go away  Pt scheduled this 8 days from now with dr. Abdi    Will forward to dr. Abdi's RN for review of possible procedure in clinic on Friday if able-- pt scheduled at 1045 at [facility] (1030 arrival)  Pt to call for any worsening symptoms in next week  Pt seemed comfortable with plan  Fuentes Andujar RN 12:56 PM 03/22/18

## 2018-03-22 NOTE — TELEPHONE ENCOUNTER
----- Message from Mcihelle Young sent at 3/22/2018 12:17 PM CDT -----  Regarding: Eye Irritation  Contact: [phone]  Pt had surgery with Dr. Abdi in November and stated that her eye is swollen and irritating and worseining. She was told that these are normal symptoms following the surgery but wonders if they should still be present. She stated that she is always rubbing her eye and that it feels like there is a lump present. Please call pt back to advise.    Thank you,    Michelle  Referral / Discharge Coordinator

## 2018-03-30 ENCOUNTER — OFFICE VISIT (OUTPATIENT)
Dept: OPHTHALMOLOGY | Facility: CLINIC | Age: 53
End: 2018-03-30
Payer: COMMERCIAL

## 2018-03-30 DIAGNOSIS — H02.831 DERMATOCHALASIS OF BOTH UPPER EYELIDS: Primary | ICD-10-CM

## 2018-03-30 DIAGNOSIS — H02.834 DERMATOCHALASIS OF BOTH UPPER EYELIDS: Primary | ICD-10-CM

## 2018-03-30 DIAGNOSIS — Z98.890 POSTOPERATIVE EYE STATE: ICD-10-CM

## 2018-03-30 ASSESSMENT — EXTERNAL EXAM - LEFT EYE: OS_EXAM: NORMAL

## 2018-03-30 ASSESSMENT — CONF VISUAL FIELD
OD_NORMAL: 1
OS_NORMAL: 1

## 2018-03-30 ASSESSMENT — VISUAL ACUITY
METHOD: SNELLEN - LINEAR
OS_SC: 20/40
OD_SC: 20/15

## 2018-03-30 ASSESSMENT — EXTERNAL EXAM - RIGHT EYE: OD_EXAM: NORMAL

## 2018-03-30 ASSESSMENT — SLIT LAMP EXAM - LIDS
COMMENTS: INCISION WELL HEALED
COMMENTS: INCISION WELL HEALED

## 2018-03-30 ASSESSMENT — TONOMETRY
OD_IOP_MMHG: 13
IOP_METHOD: ICARE
OS_IOP_MMHG: 11

## 2018-03-30 NOTE — MR AVS SNAPSHOT
After Visit Summary   3/30/2018    Oma Gaona    MRN: [id number]           Patient Information     Date Of Birth          1965        Visit Information        Provider Department      3/30/2018 10:45 AM Samuel Abdi MD [facility] Ophthalmology        Today's Diagnoses     Dermatochalasis of both upper eyelids    -  1    Postoperative eye state           Follow-ups after your visit        Follow-up notes from your care team     Return in about 6 weeks (around 5/11/2018).      Who to contact     Please call your clinic at [phone] to:    Ask questions about your health    Make or cancel appointments    Discuss your medicines    Learn about your test results    Speak to your doctor            Additional Information About Your Visit        [facility] Information     [employer] gives you secure access to your electronic health record. If you see a primary care provider, you can also send messages to your care team and make appointments. If you have questions, please call your primary care clinic.  If you do not have a primary care provider, please call [phone] and they will assist you.      [employer] is an electronic gateway that provides easy, online access to your medical records. With [employer], you can request a clinic appointment, read your test results, renew a prescription or communicate with your care team.     To access your existing account, please contact your [facility] Clinic or call [phone] for assistance.        Care EveryWhere ID     This is your Care EveryWhere ID. This could be used by other organizations to access your [place] medical records  [id number]         Blood Pressure from Last 3 Encounters:   11/08/17 107/67   10/25/17 100/65   02/15/16 114/66    Weight from Last 3 Encounters:   11/08/17 63.5 kg (140 lb)   10/25/17 64.6 kg (142 lb 8 oz)   09/11/17 61.2 kg (135 lb)              Today, you had the following     No orders found for display          Today's Medication Changes          These changes are accurate as of 3/30/18 11:57 AM.  If you have any questions, ask your nurse or doctor.               Start taking these medicines.        Dose/Directions    dexamethasone 0.4 MG/0.1 ML Inj   Commonly known as:  DECADRON   Used for:  Dermatochalasis of both upper eyelids, Postoperative eye state   Started by:  Samuel Abdi MD        Dose:  0.1 mL   Inject 0.1 mLs (0.4 mg) into the eye once for 1 dose   Quantity:  0.1 mL   Refills:  0            Where to get your medicines      Some of these will need a paper prescription and others can be bought over the counter.  Ask your nurse if you have questions.     You don't need a prescription for these medications     dexamethasone 0.4 MG/0.1 ML Inj                Primary Care Provider Office Phone # Fax #    Janey MD Sacha [phone] [address] [address]        Equal Access to Services     [facility] : Hadii brenda garcia hadasho Soomaali, waaxda luqadaha, qaybta kaalmada adeegyada, waxay filiberto garza . So [facility] [phone].    ATENCIÓN: Si habla español, tiene a [place] disposición servicios gratuitos de asistencia lingüística. Llame al [phone].    We comply with applicable federal civil rights laws and [place] laws. We do not discriminate on the basis of race, color, national origin, age, disability, sex, sexual orientation, or gender identity.            Thank you!     Thank you for choosing [facility] OPHTHALMOLOGY  for your care. Our goal is always to provide you with excellent care. Hearing back from our patients is one way we can continue to improve our services. Please take a few minutes to complete the written survey that you may receive in the mail after your visit with us. Thank you!             Your Updated Medication List - Protect others around you: Learn how to safely use, store and throw away your medicines at  www.disposemymeds.org.          This list is accurate as of 3/30/18 11:57 AM.  Always use your most recent med list.                   Brand Name Dispense Instructions for use Diagnosis    ADVIL PO      Take 200 mg by mouth every 8 hours as needed        dexamethasone 0.4 MG/0.1 ML Inj    DECADRON    0.1 mL    Inject 0.1 mLs (0.4 mg) into the eye once for 1 dose    Dermatochalasis of both upper eyelids, Postoperative eye state       levonorgestrel-ethinyl estradiol 0.15-30 MG-MCG per tablet    DION     Take 1 tablet by mouth daily

## 2018-03-30 NOTE — NURSING NOTE
Chief Complaints and History of Present Illnesses   Patient presents with     Follow Up For     Left eye brow lump      HPI    Affected eye(s):  Left   Symptoms:     No blurred vision   No floaters   No flashes   No itching   No burning         Do you have eye pain now?:  No      Comments:    Patient notes she has a bump on the LASHAWN x sx in November, has been massaging with no relief, notes it has gotten bigger in the past few weeks    Josephine Adair March 30, 2018 10:57 AM

## 2018-03-30 NOTE — PROGRESS NOTES
"  Chief Complaints and History of Present Illnesses   Patient presents with     Follow Up For     Left eye brow lump      S/p bilateral upper blepharoplasty with brassiere suture 11/8/2017  Now with left brow \"bump\"  Non-tender         Assessment & Plan     Oma Gaona is a 52 year old female with the following diagnoses:   1. Dermatochalasis of both upper eyelids    2. Postoperative eye state       - left suture granuloma to Vicryl    PLAN:  Decadron injection to left brow area today  Return to clinic 6 weeks           Attending Physician Attestation:  I have seen and examined this patient.  I have confirmed and edited as necessary the chief complaint(s), history of present illness, review of systems, relevant history, and examination findings as documented by others.  I have personally reviewed the relevant tests, images, and reports as documented above.  I have confirmed and edited as necessary the assessment and plan and agree with this note.    - Samuel Abdi MD 11:08 AM 3/30/2018    Today with Oma Gaona, I reviewed the indications, risks, benefits, and alternatives of the proposed injection including, but not limited to, failure obtain the desired result  and need for additional surgery, bleeding, infection, loss of vision, loss of the eye, and the remote possibility of permanent damage to any organ system or death with the use of anesthesia.  I provided multiple opportunities for the questions, answered all questions to the best of my ability, and confirmed that my answers and my discussion were understood.     - Samuel Abdi MD 11:14 AM 3/30/2018    "

## 2018-07-16 ENCOUNTER — OFFICE VISIT (OUTPATIENT)
Dept: OPHTHALMOLOGY | Facility: CLINIC | Age: 53
End: 2018-07-16
Payer: COMMERCIAL

## 2018-07-16 DIAGNOSIS — H02.9 EYELID LESION: Primary | ICD-10-CM

## 2018-07-16 ASSESSMENT — TONOMETRY
OD_IOP_MMHG: 8
IOP_METHOD: ICARE
OS_IOP_MMHG: 9

## 2018-07-16 ASSESSMENT — SLIT LAMP EXAM - LIDS: COMMENTS: INCISION WELL HEALED

## 2018-07-16 ASSESSMENT — VISUAL ACUITY
OS_SC+: -
METHOD: SNELLEN - LINEAR
OS_SC: 20/25
OD_SC: 20/20

## 2018-07-16 ASSESSMENT — EXTERNAL EXAM - RIGHT EYE: OD_EXAM: NORMAL

## 2018-07-16 ASSESSMENT — EXTERNAL EXAM - LEFT EYE: OS_EXAM: NORMAL

## 2018-07-16 NOTE — PROGRESS NOTES
"S/p bilateral upper blepharoplasty with brassiere suture 11/8/2017  Last seen 3/2018 with left brow \"bump\" that is non-tender likely suture granuloma to vicryl    - S/p decadron injection 3/2018   - Not improved per patient         Assessment & Plan      Oma Gaona is a 52 year old female with the following diagnoses:   1. Dermatochalasis of both upper eyelids    2. Postoperative eye state       S/p bilateral upper blepharoplasty with brassiere suture 11/8/2017  Last seen 3/2018 with left brow \"bump\" that is non-tender likely suture granuloma to vicryl    - S/p decadron injection 3/2018   - Not improved per patient         PLAN:  -   - Return to clinic in as needed for excision of eyelid lesions      Patient seen and examined by staff, Dr. Abdi.     Subhash Arteaga MD  Ophthalmology Resident  PGY-1      Answers for HPI/ROS submitted by the patient on 7/16/2018   PHQ-2 Score: 0    Attending Physician Attestation:  Complete documentation of historical and exam elements from today's encounter can be found in the full encounter summary report (not reduplicated in this progress note).  I personally obtained the chief complaint(s) and history of present illness.  I confirmed and edited as necessary the review of systems, past medical/surgical history, family history, social history, and examination findings as documented by others; and I examined the patient myself.  I personally reviewed the relevant tests, images, and reports as documented above.  I formulated and edited as necessary the assessment and plan and discussed the findings and management plan with the patient and family. I personally reviewed the ophthalmic test(s) associated with this encounter, agree with the interpretation(s) as documented by the resident/fellow, and have edited the corresponding report(s) as necessary.   -Samuel Abdi MD    "

## 2018-07-16 NOTE — NURSING NOTE
Chief Complaints and History of Present Illnesses   Patient presents with     Follow Up For     left brow bump     HPI    Affected eye(s):  Left   Symptoms:     No blurred vision      Frequency:  Constant       Do you have eye pain now?:  No      Comments:  Left bump is still present. It did not change with injection. Seems to be pushing lid down a little as well. No pain or irritation. No changes to vision. No eye drops      Naa ZIMMER 8:10 AM July 16, 2018

## 2018-07-16 NOTE — MR AVS SNAPSHOT
After Visit Summary   7/16/2018    Oma Gaona    MRN: [id number]           Patient Information     Date Of Birth          1965        Visit Information        Provider Department      7/16/2018 8:15 AM Samuel Abdi MD [facility]        Today's Diagnoses     Eyelid lesion    -  1       Follow-ups after your visit        Follow-up notes from your care team     Return for OCULOPLASTICS CLINIC, PROCEDURE.      Your next 10 appointments already scheduled     Jul 23, 2018  3:00 PM CDT   (Arrive by 2:45 PM)   Procedure with Samuel Abdi MD   [facility] ([facility] [place])    [address] [place] [address] [phone]              Who to contact     Please call your clinic at [phone] to:    Ask questions about your health    Make or cancel appointments    Discuss your medicines    Learn about your test results    Speak to your doctor            Additional Information About Your Visit        MyChart Information     [employer] gives you secure access to your electronic health record. If you see a primary care provider, you can also send messages to your care team and make appointments. If you have questions, please call your primary care clinic.  If you do not have a primary care provider, please call [phone] and they will assist you.      [employer] is an electronic gateway that provides easy, online access to your medical records. With [employer], you can request a clinic appointment, read your test results, renew a prescription or communicate with your care team.     To access your existing account, please contact your [facility] or call [phone] for assistance.        Care EveryWhere ID     This is your Care EveryWhere ID. This could be used by other organizations to access your [place] medical records  [id number]         Blood Pressure from Last 3 Encounters:   11/08/17 107/67    10/25/17 100/65   02/15/16 114/66    Weight from Last 3 Encounters:   11/08/17 63.5 kg (140 lb)   10/25/17 64.6 kg (142 lb 8 oz)   09/11/17 61.2 kg (135 lb)              Today, you had the following     No orders found for display       Primary Care Provider Office Phone # Fax #    Janey Goncalves [phone]       [address] [address]        Equal Access to Services     [facility]: Hadii aad ku hadasho Soomaali, waaxda luqadaha, qaybta kaalmada adeegyada, alexandr garza . So [facility] [phone].    ATENCIÓN: Si habla español, tiene a [place] disposición servicios gratuitos de asistencia lingüística. [facility] [phone].    We comply with applicable federal civil rights laws and [place] laws. We do not discriminate on the basis of race, color, national origin, age, disability, sex, sexual orientation, or gender identity.            Thank you!     Thank you for choosing [facility] OPHTHALMOLOG[facility]  for your care. Our goal is always to provide you with excellent care. Hearing back from our patients is one way we can continue to improve our services. Please take a few minutes to complete the written survey that you may receive in the mail after your visit with us. Thank you!             Your Updated Medication List - Protect others around you: Learn how to safely use, store and throw away your medicines at www.disposemymeds.org.          This list is accurate as of 7/16/18  9:07 AM.  Always use your most recent med list.                   Brand Name Dispense Instructions for use Diagnosis    ADVIL PO      Take 200 mg by mouth every 8 hours as needed        dexamethasone 0.4 MG/0.1 ML Inj    DECADRON    0.1 mL    Inject 0.1 mLs (0.4 mg) into the eye once for 1 dose    Dermatochalasis of both upper eyelids, Postoperative eye state       levonorgestrel-ethinyl estradiol 0.15-30 MG-MCG per tablet    DION     Take 1 tablet by mouth daily

## 2018-10-11 ENCOUNTER — TELEPHONE (OUTPATIENT)
Dept: OPHTHALMOLOGY | Facility: CLINIC | Age: 53
End: 2018-10-11

## 2018-10-12 ENCOUNTER — TELEPHONE (OUTPATIENT)
Dept: OPHTHALMOLOGY | Facility: CLINIC | Age: 53
End: 2018-10-12

## 2018-10-15 ENCOUNTER — TELEPHONE (OUTPATIENT)
Dept: OPHTHALMOLOGY | Facility: CLINIC | Age: 53
End: 2018-10-15

## 2018-10-15 NOTE — TELEPHONE ENCOUNTER
FUTURE VISIT INFORMATION      FUTURE VISIT INFORMATION:    Date: 11/12/18    Time: 1130am    Location: [facility]  REFERRAL INFORMATION:    Referring provider:  Samuel Abdi    Referring providers clinic:  [facility]    Reason for visit/diagnosis PROCEDURE. Excise left upper lid lesions

## 2018-11-12 ENCOUNTER — OFFICE VISIT (OUTPATIENT)
Dept: OPHTHALMOLOGY | Facility: CLINIC | Age: 53
End: 2018-11-12
Payer: COMMERCIAL

## 2018-11-12 ENCOUNTER — PRE VISIT (OUTPATIENT)
Dept: OPHTHALMOLOGY | Facility: CLINIC | Age: 53
End: 2018-11-12

## 2018-11-12 DIAGNOSIS — H02.9 LESION OF UPPER EYELID: ICD-10-CM

## 2018-11-12 DIAGNOSIS — H02.9 EYELID LESION: Primary | ICD-10-CM

## 2018-11-12 RX ORDER — LIDOCAINE HYDROCHLORIDE AND EPINEPHRINE 10; 10 MG/ML; UG/ML
1 INJECTION, SOLUTION INFILTRATION; PERINEURAL ONCE
Qty: 1 ML | Refills: 0 | OUTPATIENT
Start: 2018-11-12 | End: 2018-11-12

## 2018-11-12 RX ORDER — ERYTHROMYCIN 5 MG/G
0.5 OINTMENT OPHTHALMIC 2 TIMES DAILY
Qty: 1 TUBE | Refills: 0
Start: 2018-11-12 | End: 2018-11-19

## 2018-11-12 ASSESSMENT — VISUAL ACUITY: METHOD: SNELLEN - LINEAR

## 2018-11-12 NOTE — PROGRESS NOTES
Chief Complaints and History of Present Illnesses   Patient presents with     Follow Up For     Eyelid lesion              Assessment & Plan     Oma Gaona is a 53 year old female with the following diagnoses:   1. Eyelid lesion       Left upper lid excise lesion with lid crease incision.              Attending Physician Attestation:  I have seen and examined this patient.  I have confirmed and edited as necessary the chief complaint(s), history of present illness, review of systems, relevant history, and examination findings as documented by others.  I have personally reviewed the relevant tests, images, and reports as documented above.  I have confirmed and edited as necessary the assessment and plan and agree with this note.    - Samuel Abdi MD 11:57 AM 11/12/2018

## 2018-11-12 NOTE — NURSING NOTE
Chief Complaints and History of Present Illnesses   Patient presents with     Follow Up For     Eyelid lesion     HPI    Affected eye(s):  Left   Symptoms:     No blurred vision      Frequency:  Constant       Do you have eye pain now?:  No      Comments:  Pt states ready to have the bump on left upper brow removed. Stitch has not desolved itself per patient.    Aj ZIMMER 11:32 AM November 12, 2018

## 2018-11-12 NOTE — MR AVS SNAPSHOT
After Visit Summary   11/12/2018    Oma Gaona    MRN: [id number]           Patient Information     Date Of Birth          1965        Visit Information        Provider Department      11/12/2018 11:30 AM Samuel Abdi MD [facility]        Today's Diagnoses     Eyelid lesion    -  1      Care Instructions    Samuel Abdi M.D.    POST OPERATIVE INSTRUCTIONS   OFFICE EYELID SURGERY      1. Ice pack immediately after surgery. Alternate ten minutes on and ten minutes off for the first 24 - 48 hours, while in a reclined position with two pillows under your head while awake.     2. You can use Tylenol extra strength for pain as directed on the bottle.     3. Sleep with two pillows under your head for the first night following surgery.     4.  If bandaged, remove the dressing 24 hours after surgery.     5. Use ointment along the incision both morning and evening until your return visit. If you get ointment in your eye, it will blur your vision slightly.     6. Avoid aspirin or anti-inflammatory medications such as Advil or Motrin for one week following your surgery unless otherwise directed.     7. Avoid strenuous activity or straining for the first week after surgery.     8. Bathing and showers are OK but to facilitate healing, do not wash or apply water to the sutured areas.     9. Small amounts of bright red blood normally stain the bandages. Some blurring of vision can be expected due to drainage and ointment in the eyes.     10. If your eyes swell shut, you cannot establish vision in the operated eye, or the pain is not reasonably controlled with medication you must contact the eye clinic immediately.     11. If you should have a problem after normal office hours, you can reach the ophthalmologist on call at [phone]. If for some reason no one can be reached, proceed to the nearest emergency room for evaluation and treatment.             Follow-ups after your visit         Follow-up notes from your care team     Return in about 3 weeks (around 12/3/2018) for RETURN OCULOPLASTICS.      Your next 10 appointments already scheduled     Nov 26, 2018 12:45 PM CST   (Arrive by 12:30 PM)   Post-Op with Samuel Abdi MD   [facility] ([facility] [place])    [address] [address] [phone]              Who to contact     Please call your clinic at [phone] to:    Ask questions about your health    Make or cancel appointments    Discuss your medicines    Learn about your test results    Speak to your doctor            Additional Information About Your Visit        [employer] Information     [employer] gives you secure access to your electronic health record. If you see a primary care provider, you can also send messages to your care team and make appointments. If you have questions, please call your primary care clinic.  If you do not have a primary care provider, please call [phone] and they will assist you.      [employer] is an electronic gateway that provides easy, online access to your medical records. With [employer], you can request a clinic appointment, read your test results, renew a prescription or communicate with your care team.     To access your existing account, please contact your [facility] or call [phone] for assistance.        Care EveryWhere ID     This is your Care EveryWhere ID. This could be used by other organizations to access your [place] medical records  [id number]         Blood Pressure from Last 3 Encounters:   11/08/17 107/67   10/25/17 100/65   02/15/16 114/66    Weight from Last 3 Encounters:   11/08/17 63.5 kg (140 lb)   10/25/17 64.6 kg (142 lb 8 oz)   09/11/17 61.2 kg (135 lb)              Today, you had the following     No orders found for display         Today's Medication Changes          These changes are accurate as of 11/12/18 12:02 PM.  If  you have any questions, ask your nurse or doctor.               Start taking these medicines.        Dose/Directions    erythromycin ophthalmic ointment   Commonly known as:  ROMYCIN   Used for:  Eyelid lesion   Started by:  Samuel Abdi MD        Dose:  0.5 inch   Place 0.5 inches Into the left eye 2 times daily for 7 days   Quantity:  1 Tube   Refills:  0       lidocaine 1% with EPINEPHrine 1:100,000 1 %-1:594220 injection   Used for:  Eyelid lesion   Started by:  Samuel Abdi MD        Dose:  1 mL   Inject 1 mL as directed once for 1 dose   Quantity:  1 mL   Refills:  0            Where to get your medicines      Some of these will need a paper prescription and others can be bought over the counter.  Ask your nurse if you have questions.     You don't need a prescription for these medications     erythromycin ophthalmic ointment    lidocaine 1% with EPINEPHrine 1:100,000 1 %-1:177831 injection                Primary Care Provider Office Phone # Fax #    Janey Goncalves [phone]       [address] [address]        Equal Access to Services     [facility]: Hadii brenda ku hadasho Soomaali, waaxda luqadaha, qaybta kaalmada adeegyada, waxay filiberto haynorbert garza . So [facility] [phone].    ATENCIÓN: Si habla español, tiene a [place] disposición servicios gratuitos de asistencia lingüística. Llame al [phone].    We comply with applicable federal civil rights laws and [place] laws. We do not discriminate on the basis of race, color, national origin, age, disability, sex, sexual orientation, or gender identity.            Thank you!     Thank you for choosing [facility]  for your care. Our goal is always to provide you with excellent care. Hearing back from our patients is one way we can continue to improve our services. Please take a few minutes to complete the written survey that you may receive in the mail after your visit with us. Thank  you!             Your Updated Medication List - Protect others around you: Learn how to safely use, store and throw away your medicines at www.disposemymeds.org.          This list is accurate as of 11/12/18 12:02 PM.  Always use your most recent med list.                   Brand Name Dispense Instructions for use Diagnosis    ADVIL PO      Take 200 mg by mouth every 8 hours as needed        dexamethasone 0.4 MG/0.1 ML Inj    DECADRON    0.1 mL    Inject 0.1 mLs (0.4 mg) into the eye once for 1 dose    Dermatochalasis of both upper eyelids, Postoperative eye state       erythromycin ophthalmic ointment    ROMYCIN    1 Tube    Place 0.5 inches Into the left eye 2 times daily for 7 days    Eyelid lesion       levonorgestrel-ethinyl estradiol 0.15-30 MG-MCG per tablet    DION     Take 1 tablet by mouth daily        lidocaine 1% with EPINEPHrine 1:100,000 1 %-1:360798 injection     1 mL    Inject 1 mL as directed once for 1 dose    Eyelid lesion

## 2018-11-12 NOTE — PATIENT INSTRUCTIONS
Samuel Abdi M.D.    POST OPERATIVE INSTRUCTIONS   OFFICE EYELID SURGERY      1. Ice pack immediately after surgery. Alternate ten minutes on and ten minutes off for the first 24 - 48 hours, while in a reclined position with two pillows under your head while awake.     2. You can use Tylenol extra strength for pain as directed on the bottle.     3. Sleep with two pillows under your head for the first night following surgery.     4.  If bandaged, remove the dressing 24 hours after surgery.     5. Use ointment along the incision both morning and evening until your return visit. If you get ointment in your eye, it will blur your vision slightly.     6. Avoid aspirin or anti-inflammatory medications such as Advil or Motrin for one week following your surgery unless otherwise directed.     7. Avoid strenuous activity or straining for the first week after surgery.     8. Bathing and showers are OK but to facilitate healing, do not wash or apply water to the sutured areas.     9. Small amounts of bright red blood normally stain the bandages. Some blurring of vision can be expected due to drainage and ointment in the eyes.     10. If your eyes swell shut, you cannot establish vision in the operated eye, or the pain is not reasonably controlled with medication you must contact the eye clinic immediately.     11. If you should have a problem after normal office hours, you can reach the ophthalmologist on call at [phone]. If for some reason no one can be reached, proceed to the nearest emergency room for evaluation and treatment.

## 2018-11-20 ENCOUNTER — PATIENT OUTREACH (OUTPATIENT)
Dept: CARE COORDINATION | Facility: CLINIC | Age: 53
End: 2018-11-20

## 2018-11-26 ENCOUNTER — OFFICE VISIT (OUTPATIENT)
Dept: OPHTHALMOLOGY | Facility: CLINIC | Age: 53
End: 2018-11-26
Payer: COMMERCIAL

## 2018-11-26 DIAGNOSIS — H02.834 DERMATOCHALASIS OF BOTH UPPER EYELIDS: Primary | ICD-10-CM

## 2018-11-26 DIAGNOSIS — Z98.890 POSTOPERATIVE EYE STATE: ICD-10-CM

## 2018-11-26 DIAGNOSIS — H02.831 DERMATOCHALASIS OF BOTH UPPER EYELIDS: Primary | ICD-10-CM

## 2018-11-26 RX ORDER — ERYTHROMYCIN 5 MG/G
0.5 OINTMENT OPHTHALMIC AT BEDTIME
COMMUNITY

## 2018-11-26 ASSESSMENT — VISUAL ACUITY
METHOD: SNELLEN - LINEAR
OD_SC: 20/20
OD_SC+: -2
OS_SC: 20/50

## 2018-11-26 ASSESSMENT — SLIT LAMP EXAM - LIDS
COMMENTS: NORMAL
COMMENTS: NORMAL

## 2018-11-26 ASSESSMENT — TONOMETRY
OS_IOP_MMHG: 09
IOP_METHOD: ICARE
OD_IOP_MMHG: 09

## 2018-11-26 ASSESSMENT — EXTERNAL EXAM - RIGHT EYE: OD_EXAM: NORMAL

## 2018-11-26 ASSESSMENT — EXTERNAL EXAM - LEFT EYE: OS_EXAM: NORMAL

## 2018-11-26 NOTE — MR AVS SNAPSHOT
After Visit Summary   11/26/2018    Oma Gaona    MRN: [id number]           Patient Information     Date Of Birth          1965        Visit Information        Provider Department      11/26/2018 12:45 PM Samuel Abdi MD [facility]        Today's Diagnoses     Dermatochalasis of both upper eyelids    -  1    Postoperative eye state           Follow-ups after your visit        Follow-up notes from your care team     Return if symptoms worsen or fail to improve.      Who to contact     Please call your clinic at [phone] to:    Ask questions about your health    Make or cancel appointments    Discuss your medicines    Learn about your test results    Speak to your doctor            Additional Information About Your Visit        [employer] Information     [employer] gives you secure access to your electronic health record. If you see a primary care provider, you can also send messages to your care team and make appointments. If you have questions, please call your primary care clinic.  If you do not have a primary care provider, please call [phone] and they will assist you.      [employer] is an electronic gateway that provides easy, online access to your medical records. With [employer], you can request a clinic appointment, read your test results, renew a prescription or communicate with your care team.     To access your existing account, please contact your [facility] or call [phone] for assistance.        Care EveryWhere ID     This is your Care EveryWhere ID. This could be used by other organizations to access your [place] medical records  [id number]         Blood Pressure from Last 3 Encounters:   11/08/17 107/67   10/25/17 100/65   02/15/16 114/66    Weight from Last 3 Encounters:   11/08/17 63.5 kg (140 lb)   10/25/17 64.6 kg (142 lb 8 oz)   09/11/17 61.2 kg (135 lb)              Today, you had the following     No orders found for  display       Primary Care Provider Office Phone # Fax #    Janey Goncalves [phone] [address] [address]        Equal Access to Services     DAL[facility] AGUILA : Wood garcia rodrigoo Sobettyali, waaxda luqadaha, qaybta kaalmada adeamadoyada, alexandr chen laFrancescanorbert gregg. So [facility] [phone].    ATENCIÓN: Si habla español, tiene a [place] disposición servicios gratuitos de asistencia lingüística. Llame al [phone].    We comply with applicable federal civil rights laws and [place] laws. We do not discriminate on the basis of race, color, national origin, age, disability, sex, sexual orientation, or gender identity.            Thank you!     Thank you for choosing [facility] OPHTHALMOLOGY  for your care. Our goal is always to provide you with excellent care. Hearing back from our patients is one way we can continue to improve our services. Please take a few minutes to complete the written survey that you may receive in the mail after your visit with us. Thank you!             Your Updated Medication List - Protect others around you: Learn how to safely use, store and throw away your medicines at www.disposemymeds.org.          This list is accurate as of 11/26/18  1:02 PM.  Always use your most recent med list.                   Brand Name Dispense Instructions for use Diagnosis    ADVIL PO      Take 200 mg by mouth every 8 hours as needed        dexamethasone 0.4 MG/0.1 ML Inj    DECADRON    0.1 mL    Inject 0.1 mLs (0.4 mg) into the eye once for 1 dose    Dermatochalasis of both upper eyelids, Postoperative eye state       erythromycin ophthalmic ointment    ROMYCIN     0.5 inches At Bedtime        levonorgestrel-ethinyl estradiol 0.15-30 MG-MCG per tablet    DION     Take 1 tablet by mouth daily

## 2018-11-26 NOTE — PROGRESS NOTES
Oma KORY Jimenez is 2 weeks status post left upper lid lesion excisions  The incision(s) is healing well.  The lid(s)  is  in excellent position.    I have recommended:    * Return to clinic in as needed     Attending Physician Attestation:  I have seen and examined this patient.  I have confirmed and edited as necessary the chief complaint(s), history of present illness, review of systems, relevant history, and examination findings as documented by others.  I have personally reviewed the relevant tests, images, and reports as documented above.  I have confirmed and edited as necessary the assessment and plan and agree with this note.    - Samuel Abdi MD 1:00 PM 11/26/2018

## 2018-11-26 NOTE — NURSING NOTE
Chief Complaints and History of Present Illnesses   Patient presents with     Post Op (Ophthalmology) Left Eye     POW#2 s/p Left upper lid excise lesion with lid crease incision     HPI    Affected eye(s):  Left   Symptoms:     No blurred vision   No itching   No burning   No eye discharge         Do you have eye pain now?:  No      Comments:    Patient notes the LE is doing great, feels it is only a little swollen yet    Josephine Adair November 26, 2018 12:50 PM

## 2019-11-08 ENCOUNTER — HEALTH MAINTENANCE LETTER (OUTPATIENT)
Age: 54
End: 2019-11-08

## 2020-02-23 ENCOUNTER — HEALTH MAINTENANCE LETTER (OUTPATIENT)
Age: 55
End: 2020-02-23

## 2020-12-06 ENCOUNTER — HEALTH MAINTENANCE LETTER (OUTPATIENT)
Age: 55
End: 2020-12-06

## 2021-02-20 ENCOUNTER — HEALTH MAINTENANCE LETTER (OUTPATIENT)
Age: 56
End: 2021-02-20

## 2021-09-25 ENCOUNTER — HEALTH MAINTENANCE LETTER (OUTPATIENT)
Age: 56
End: 2021-09-25

## 2022-01-15 ENCOUNTER — HEALTH MAINTENANCE LETTER (OUTPATIENT)
Age: 57
End: 2022-01-15

## 2022-03-12 ENCOUNTER — HEALTH MAINTENANCE LETTER (OUTPATIENT)
Age: 57
End: 2022-03-12

## 2023-01-07 ENCOUNTER — HEALTH MAINTENANCE LETTER (OUTPATIENT)
Age: 58
End: 2023-01-07

## 2023-04-22 ENCOUNTER — HEALTH MAINTENANCE LETTER (OUTPATIENT)
Age: 58
End: 2023-04-22

## (undated) DEVICE — PEN MARKING SKIN VISIMARK 1424SR

## (undated) DEVICE — GLOVE PROTEXIS MICRO 7.5  2D73PM75

## (undated) DEVICE — NDL 30GA 0.5" 305106

## (undated) DEVICE — EYE PREP BETADINE 5% SOLUTION 30ML 0065-0411-30

## (undated) DEVICE — SU CHROMIC 6-0 S-14DA 18" 1791G

## (undated) DEVICE — SU PLAIN 6-0 G-1DA 18" 770G

## (undated) DEVICE — ESU EYE HIGH TEMP 65410-183

## (undated) DEVICE — SYR 03ML LL W/O NDL 309657

## (undated) DEVICE — PACK MINOR EYE CUSTOM ASC

## (undated) DEVICE — BLADE KNIFE SURG 15 371115

## (undated) DEVICE — LINEN TOWEL PACK X5 5464

## (undated) RX ORDER — ERYTHROMYCIN 5 MG/G
OINTMENT OPHTHALMIC
Status: DISPENSED
Start: 2017-11-08

## (undated) RX ORDER — ACETAMINOPHEN 325 MG/1
TABLET ORAL
Status: DISPENSED
Start: 2017-11-08

## (undated) RX ORDER — LIDOCAINE HYDROCHLORIDE AND EPINEPHRINE 10; 10 MG/ML; UG/ML
INJECTION, SOLUTION INFILTRATION; PERINEURAL
Status: DISPENSED
Start: 2018-11-12

## (undated) RX ORDER — ERYTHROMYCIN 5 MG/G
OINTMENT OPHTHALMIC
Status: DISPENSED
Start: 2018-11-12

## (undated) RX ORDER — DEXAMETHASONE SODIUM PHOSPHATE 4 MG/ML
INJECTION, SOLUTION INTRA-ARTICULAR; INTRALESIONAL; INTRAMUSCULAR; INTRAVENOUS; SOFT TISSUE
Status: DISPENSED
Start: 2018-03-30

## (undated) RX ORDER — TETRACAINE HYDROCHLORIDE 5 MG/ML
SOLUTION OPHTHALMIC
Status: DISPENSED
Start: 2017-11-08

## (undated) RX ORDER — PROPOFOL 10 MG/ML
INJECTION, EMULSION INTRAVENOUS
Status: DISPENSED
Start: 2017-11-08

## (undated) RX ORDER — LIDOCAINE HYDROCHLORIDE 20 MG/ML
INJECTION, SOLUTION EPIDURAL; INFILTRATION; INTRACAUDAL; PERINEURAL
Status: DISPENSED
Start: 2017-11-08